# Patient Record
Sex: MALE | Race: OTHER | Employment: FULL TIME | URBAN - METROPOLITAN AREA
[De-identification: names, ages, dates, MRNs, and addresses within clinical notes are randomized per-mention and may not be internally consistent; named-entity substitution may affect disease eponyms.]

---

## 2022-07-19 ENCOUNTER — TELEPHONE (OUTPATIENT)
Dept: ORTHOPEDIC SURGERY | Age: 67
End: 2022-07-19

## 2022-07-25 ENCOUNTER — OFFICE VISIT (OUTPATIENT)
Dept: ORTHOPEDIC SURGERY | Age: 67
End: 2022-07-25
Payer: COMMERCIAL

## 2022-07-25 DIAGNOSIS — M17.0 PRIMARY OSTEOARTHRITIS OF BOTH KNEES: Primary | ICD-10-CM

## 2022-07-25 PROCEDURE — 20610 DRAIN/INJ JOINT/BURSA W/O US: CPT | Performed by: ORTHOPAEDIC SURGERY

## 2022-07-25 NOTE — PROGRESS NOTES
Progress Notes by Laura Monzon MD at 01/04/22 2285                    Author: Laura Monzon MD  Service: --  Author Type: Physician       Filed: 01/04/22 1214  Encounter Date: 1/4/2022  Status: Signed          : Laura Monzon MD (Physician)                            Name: Adriana Erickson   YOB: 1955   Gender: male   MRN: [id number]               HPI: Adriana Erickson is a 73 y.o.  gentleman seen for left knee problems. He has known osteoarthritis in both knees. He returns for repeat viscosupplementation into both knees. ROS/Meds/PSH/PMH/FH/SH: A ten system review of systems was performed and is negative other than what is in the HPI. Tobacco:  reports that he has never smoked. He has never used smokeless tobacco.   There were no vitals taken for this visit. Physical Examination:   He is an awake alert pleasant gentleman ambulate with an antalgic gait on the left side. The right knee has a range of motion of 0 to 130 degrees   Global right knee pain   negative Lachman,   negative anterior drawer,    negative posterior drawer   negative pivot. Good tibial step-off,    No varus or valgus laxity at 0 or 30 degrees. Negative lateral joint line tenderness    negative lateral Dejuan. Negative medial joint line tenderness   negative medial Dejuan. No evidence of any posterolateral instability. No patellofemoral pain. Negative compression,    negative apprehension   no effusion. Calves Are non-tender,   neurovascularly patient is intact. Negative Homans. MPFL is non-tender. Patient Can fully extend the knee. Good quad tone   No erythema. Negative Dial test.         Left knee has a medial parapatellar incision that is healed. The left knee has a range of motion of 0 to 130 degrees   Trace Lachman,   Trace anterior drawer,    negative posterior drawer   negative pivot.     Good tibial step-off,    No varus or valgus laxity at 0 or 30 degrees. Negative lateral joint line tenderness    negative lateral Dejuan. Marked medial joint line tenderness   negative medial Dejuan. No evidence of any posterolateral instability. Positive patellofemoral pain. Negative compression,    negative apprehension   Trace effusion   Calves Are non-tender,   neurovascularly patient is intact. Negative Homans. MPFL is non-tender. Patient Can fully extend the knee. Good quad tone   No erythema. Negative Dial test.                     Data Reviewed:   Minor procedure:     [unfilled]  OFFICE PROCEDURE PROGRESS NOTE    Chart reviewed for the following:   Maria Del Carmen Augustine MD, have reviewed the History, Physical and updated the Allergic reactions for [place] [address] performed immediately prior to start of procedure:   Maria Del Carmen Augustine MD, have performed the following reviews on Ebe Martinez prior to the start of the procedure:            * Patient was identified by name and date of birth   * Agreement on procedure being performed was verified  * Risks and Benefits explained to the patient  * Procedure site verified and marked as necessary  * Patient was positioned for comfort     Time: 2:29 PM   Date of procedure: 7/25/2022  Procedure performed by:  Glenna Casas MD    Procedure: After sterile prep and drape the patient received Durolane visco-supplementation 60 mg and 3 cc into the right knee. The patient tolerated the procedure well. A sterile dressing was applied.           OFFICE PROCEDURE PROGRESS NOTE    Chart reviewed for the following:   Maria Del Carmen Augustine MD, have reviewed the History, Physical and updated the Allergic reactions for [place] [address] performed immediately prior to start of procedure:   Maria Del Carmen Augustine MD, have performed the following reviews on Ebe Martinez prior to the start of the procedure:            * Patient was identified by name and date of birth   * Agreement on procedure being performed was verified  * Risks and Benefits explained to the patient  * Procedure site verified and marked as necessary  * Patient was positioned for comfort     Time: 2:30 PM   Date of procedure: 7/25/2022  Procedure performed by:  James Vega MD    Procedure: After sterile prep and drape the patient received Durolane visco-supplementation 60 mg and 3 cc into the left knee. The patient tolerated the procedure well. A sterile dressing was applied. Impression:         1. Primary osteoarthritis of both knees           Status post Durolane viscosupplementation into both knees 7/25/2022    Status post previous left knee operative procedure. Glenohumeral osteoarthritis left shoulder    Biceps tendinitis left shoulder    AC joint arthritis left shoulder    Atrial fibrillation on Eliquis    Hypertension    Gout      Plan:    I discussed the problem with the patient. He will follow our post viscosupplementation protocol we will recheck back in 6 months for repeat viscosupplementation into both knees         Follow up:         Follow-up and Dispositions       Return in about 6 months                              Genoveva Hatchet., MD

## 2022-12-30 ENCOUNTER — TELEPHONE (OUTPATIENT)
Dept: ORTHOPEDIC SURGERY | Age: 67
End: 2022-12-30

## 2022-12-30 NOTE — TELEPHONE ENCOUNTER
Pt is  down on 1/24/23 and he states it is supposed to be  for  bilateral  gel injections. If not able to  do  please  call pt.   Otherwise  I  labeled it and it will need  your  approval

## 2023-01-09 DIAGNOSIS — M17.0 BILATERAL PRIMARY OSTEOARTHRITIS OF KNEE: Primary | ICD-10-CM

## 2023-01-24 ENCOUNTER — OFFICE VISIT (OUTPATIENT)
Dept: ORTHOPEDIC SURGERY | Age: 68
End: 2023-01-24

## 2023-01-24 DIAGNOSIS — M17.0 BILATERAL PRIMARY OSTEOARTHRITIS OF KNEE: Primary | ICD-10-CM

## 2023-01-24 NOTE — PROGRESS NOTES
Progress Notes by Haja Junior MD at 01/04/22 8381                    Author: Haja Junior MD  Service: --  Author Type: Physician       Filed: 01/04/22 1214  Encounter Date: 1/4/2022  Status: Signed          : Haja Junior MD (Physician)                            Name: Gia Alvarez   YOB: 1955   Gender: male   MRN: [id number]               HPI: Gia Alvarez is a 73 y.o.  gentleman seen for bilateral knee problems. He has known osteoarthritis in both knees. He returns for repeat viscosupplementation into both knees. The Durolane viscosupplementation worked really well last time. He has lost 30 pounds      ROS/Meds/PSH/PMH/FH/SH: A ten system review of systems was performed and is negative other than what is in the HPI. Tobacco:  reports that he has never smoked. He has never used smokeless tobacco.   There were no vitals taken for this visit. Physical Examination:   He is an awake alert pleasant gentleman ambulate with an antalgic gait on the left side. The right knee has a range of motion of 0 to 130 degrees   Global right knee pain   negative Lachman,   negative anterior drawer,    negative posterior drawer   negative pivot. Good tibial step-off,    No varus or valgus laxity at 0 or 30 degrees. Negative lateral joint line tenderness    negative lateral Dejuan. Negative medial joint line tenderness   negative medial Dejuan. No evidence of any posterolateral instability. No patellofemoral pain. Negative compression,    negative apprehension   no effusion. Calves Are non-tender,   neurovascularly patient is intact. Negative Homans. MPFL is non-tender. Patient Can fully extend the knee. Good quad tone   No erythema. Negative Dial test.         Left knee has a medial parapatellar incision that is healed.    The left knee has a range of motion of 0 to 130 degrees   Trace Lachman,   Trace anterior drawer, negative posterior drawer   negative pivot. Good tibial step-off,    No varus or valgus laxity at 0 or 30 degrees. Negative lateral joint line tenderness    negative lateral Dejuan. Marked medial joint line tenderness   negative medial Dejuan. No evidence of any posterolateral instability. Positive patellofemoral pain. Negative compression,    negative apprehension   Trace effusion   Calves Are non-tender,   neurovascularly patient is intact. Negative Homans. MPFL is non-tender. Patient Can fully extend the knee. Good quad tone   No erythema. Negative Dial test.                     Data Reviewed:   Minor procedure:            OFFICE PROCEDURE PROGRESS NOTE    Chart reviewed for the following:   Marissa Junior MD, have reviewed the History, Physical and updated the Allergic reactions for [place] [address] performed immediately prior to start of procedure:   Marissa Junior MD, have performed the following reviews on Coco Boxer prior to the start of the procedure:            * Patient was identified by name and date of birth   * Agreement on procedure being performed was verified  * Risks and Benefits explained to the patient  * Procedure site verified and marked as necessary  * Patient was positioned for comfort     Time: 10:28 AM   Date of procedure: 1/24/2023  Procedure performed by:  Raghu Ken MD    Procedure: After sterile prep and drape the patient received Durolane visco-supplementation 60 mg in 3 cc into the right knee. The patient tolerated the procedure well. A sterile dressing was applied.           OFFICE PROCEDURE PROGRESS NOTE    Chart reviewed for the following:   Marissa Junior MD, have reviewed the History, Physical and updated the Allergic reactions for [place] [address] performed immediately prior to start of procedure:   Marissa Junior MD, have performed the following reviews on Coco Boxer prior to the start of the procedure:            * Patient was identified by name and date of birth   * Agreement on procedure being performed was verified  * Risks and Benefits explained to the patient  * Procedure site verified and marked as necessary  * Patient was positioned for comfort     Time: 10:28 AM   Date of procedure: 1/24/2023  Procedure performed by:  Anastacia Mccartney MD    Procedure: After sterile prep and drape the patient received Durolane visco-supplementation 60 mg in 3 cc into the left knee. The patient tolerated the procedure well. A sterile dressing was applied. Impression:         1. Primary osteoarthritis of both knees           Status post Durolane viscosupplementation into both knees 1/24/2023, 7/25/2022    Status post previous left knee operative procedure. Glenohumeral osteoarthritis left shoulder    Biceps tendinitis left shoulder    AC joint arthritis left shoulder    Atrial fibrillation on Eliquis    Hypertension    Gout      Plan:    I discussed the problem with the patient. He will follow our post viscosupplementation protocol we will recheck back in 6 months for repeat viscosupplementation into both knees in August 2023         Follow up:         Follow-up and Dispositions       Return in about 6 months                              Saud Colvin MD

## 2023-06-27 ENCOUNTER — OFFICE VISIT (OUTPATIENT)
Dept: ORTHOPEDIC SURGERY | Age: 68
End: 2023-06-27

## 2023-06-27 ENCOUNTER — TELEPHONE (OUTPATIENT)
Dept: ORTHOPEDIC SURGERY | Age: 68
End: 2023-06-27

## 2023-06-27 DIAGNOSIS — M1A.9XX1 CHRONIC GOUT INVOLVING TOE OF LEFT FOOT WITH TOPHUS, UNSPECIFIED CAUSE: ICD-10-CM

## 2023-06-27 DIAGNOSIS — M21.612 BUNION OF GREAT TOE OF LEFT FOOT: Primary | ICD-10-CM

## 2023-06-27 DIAGNOSIS — M75.22 BICIPITAL TENDINITIS OF LEFT SHOULDER: ICD-10-CM

## 2023-06-27 DIAGNOSIS — M19.012 DEGENERATIVE JOINT DISEASE OF LEFT ACROMIOCLAVICULAR JOINT: ICD-10-CM

## 2023-06-27 DIAGNOSIS — M19.012 OSTEOARTHRITIS OF LEFT GLENOHUMERAL JOINT: Primary | ICD-10-CM

## 2023-06-27 DIAGNOSIS — M17.0 BILATERAL PRIMARY OSTEOARTHRITIS OF KNEE: ICD-10-CM

## 2023-06-27 DIAGNOSIS — M21.612 BUNION OF GREAT TOE OF LEFT FOOT: ICD-10-CM

## 2023-06-27 RX ORDER — METHYLPREDNISOLONE ACETATE 80 MG/ML
80 INJECTION, SUSPENSION INTRA-ARTICULAR; INTRALESIONAL; INTRAMUSCULAR; SOFT TISSUE ONCE
Status: COMPLETED | OUTPATIENT
Start: 2023-06-27 | End: 2023-06-27

## 2023-06-27 RX ADMIN — METHYLPREDNISOLONE ACETATE 80 MG: 80 INJECTION, SUSPENSION INTRA-ARTICULAR; INTRALESIONAL; INTRAMUSCULAR; SOFT TISSUE at 10:02

## 2023-07-26 DIAGNOSIS — M17.0 PRIMARY OSTEOARTHRITIS OF BOTH KNEES: Primary | ICD-10-CM

## 2023-08-15 ENCOUNTER — OFFICE VISIT (OUTPATIENT)
Dept: ORTHOPEDIC SURGERY | Age: 68
End: 2023-08-15
Payer: COMMERCIAL

## 2023-08-15 DIAGNOSIS — M19.012 DEGENERATIVE JOINT DISEASE OF LEFT ACROMIOCLAVICULAR JOINT: ICD-10-CM

## 2023-08-15 DIAGNOSIS — M21.612 BUNION OF GREAT TOE OF LEFT FOOT: ICD-10-CM

## 2023-08-15 DIAGNOSIS — M19.012 OSTEOARTHRITIS OF LEFT GLENOHUMERAL JOINT: Primary | ICD-10-CM

## 2023-08-15 DIAGNOSIS — M1A.9XX1 CHRONIC GOUT INVOLVING TOE OF LEFT FOOT WITH TOPHUS, UNSPECIFIED CAUSE: ICD-10-CM

## 2023-08-15 DIAGNOSIS — M75.22 BICIPITAL TENDINITIS OF LEFT SHOULDER: ICD-10-CM

## 2023-08-15 PROCEDURE — 1123F ACP DISCUSS/DSCN MKR DOCD: CPT | Performed by: ORTHOPAEDIC SURGERY

## 2023-08-15 PROCEDURE — 99215 OFFICE O/P EST HI 40 MIN: CPT | Performed by: ORTHOPAEDIC SURGERY

## 2023-08-15 NOTE — PROGRESS NOTES
Progress Notes by Americo Bond MD at 01/04/22 9359                    Author: Americo Bond MD  Service: --  Author Type: Physician       Filed: 01/04/22 1214  Encounter Date: 1/4/2022  Status: Signed          : Americo Bond MD (Physician)                            Name: Yahir Askew   YOB: 1955   Gender: male   MRN: [id number]     What: Left shoulder pain  How: Insidious onset         HPI: Yahir Askew is a 78 y.o. right-hand-dominant gentleman seen for left shoulder pain. He has known osteoarthritis in both knees. He has had viscosupplementation into both knees. His knees are doing better. He has known glenohumeral osteoarthritis left shoulder. I have injected him previously. His left shoulder has progressively gotten worse. He complains of severe pain and limitation of function. It is affecting his quality of life. ROS/Meds/PSH/PMH/FH/SH: A ten system review of systems was performed and is negative other than what is in the HPI. Tobacco:  reports that he has never smoked. He has never used smokeless tobacco.   There were no vitals taken for this visit. Physical Examination:   He is an awake alert pleasant gentleman ambulate with an antalgic gait on the left side. The right knee has a range of motion of 0 to 130 degrees   Global right knee pain   negative Lachman,   negative anterior drawer,    negative posterior drawer   negative pivot. Good tibial step-off,    No varus or valgus laxity at 0 or 30 degrees. Negative lateral joint line tenderness    negative lateral Dejuan. Negative medial joint line tenderness   negative medial Dejuan. No evidence of any posterolateral instability. No patellofemoral pain. Negative compression,    negative apprehension   no effusion. Calves Are non-tender,   neurovascularly patient is intact. Negative Homans. MPFL is non-tender. Patient Can fully extend the knee.     Good

## 2023-08-17 ENCOUNTER — TELEPHONE (OUTPATIENT)
Dept: ORTHOPEDIC SURGERY | Age: 68
End: 2023-08-17

## 2023-08-17 NOTE — TELEPHONE ENCOUNTER
Called and spoke to pt and informed him that we received a message from [address] stating that he will need to be seen prior to getting cardiac clearance. Pt will call to schedule.

## 2023-08-22 ENCOUNTER — HOSPITAL ENCOUNTER (OUTPATIENT)
Age: 68
Discharge: HOME | End: 2023-08-24
Payer: COMMERCIAL

## 2023-08-22 DIAGNOSIS — M19.012 OSTEOARTHRITIS OF LEFT GLENOHUMERAL JOINT: ICD-10-CM

## 2023-08-22 PROCEDURE — 73221 MRI JOINT UPR EXTREM W/O DYE: CPT

## 2023-08-23 ENCOUNTER — TELEPHONE (OUTPATIENT)
Dept: ORTHOPEDIC SURGERY | Age: 68
End: 2023-08-23

## 2023-08-23 NOTE — TELEPHONE ENCOUNTER
Called and spoke with the patient, he goes to the cardiologist tomorrow at 2pm, told patient once I receive clearance I can book him for surgery. Patient will update tomorrow after his appointment.

## 2023-08-23 NOTE — TELEPHONE ENCOUNTER
He had his MRI yesterday. His shoulder has popped out twice in three days. He wants to know if this whole process can go any faster because he is hurting. Please give him a call.

## 2023-08-24 ENCOUNTER — TELEPHONE (OUTPATIENT)
Dept: ORTHOPEDIC SURGERY | Age: 68
End: 2023-08-24

## 2023-08-24 NOTE — TELEPHONE ENCOUNTER
Called and spoke to pt who states that he was seen by card today and was given the \"all clear for surgery\". I informed him that we have not received clearance yet, but when we do, we can schedule surgery. Pt states he is scheduled to have echo tomorrow at 2pm for his yearly evaluation, but states he was told that clearance would be sent prior to his echo. Pt would like surgery next week if possible. LBF informed.

## 2023-08-24 NOTE — TELEPHONE ENCOUNTER
He is having surgery next week. He had an EKG today and is going to have to have an ECHO. He wants to know what this will be done. This was left on voicemail. I called the patient, he says the EKG normal. He says they are faxing the clearance over. He wants to know when he can get surgery scheduled. He says the ECHO is just an \"extra step\".

## 2023-08-28 ENCOUNTER — TELEPHONE (OUTPATIENT)
Dept: ORTHOPEDIC SURGERY | Age: 68
End: 2023-08-28

## 2023-08-28 NOTE — TELEPHONE ENCOUNTER
Called and left voicemail for patient, that we need a clearance letter from cardiology since echo has been done to ensure there has not been any changes.

## 2023-08-28 NOTE — TELEPHONE ENCOUNTER
Unable to contact pt's cell or LVM on cell phone due to blocked call message 2x. Spoke with pt's wife when home phone number was utilized to notify pt that cardiac clearance has not been received. Kaleb Gastelum has sent a request for cardiac clearance to cardiologist. Pt has 10:15am appt tomorrow morning.

## 2023-08-28 NOTE — TELEPHONE ENCOUNTER
He is calling again very frustrated that isn't able to speak with someone. He states he needs to know if his surgery is on Thursday or Friday. I had him on hold and was checking with Yoshi Madison but he hung up. Please call as soon as you can.

## 2023-08-29 ENCOUNTER — OFFICE VISIT (OUTPATIENT)
Dept: ORTHOPEDIC SURGERY | Age: 68
End: 2023-08-29
Payer: COMMERCIAL

## 2023-08-29 DIAGNOSIS — M19.012 OSTEOARTHRITIS OF LEFT GLENOHUMERAL JOINT: Primary | ICD-10-CM

## 2023-08-29 DIAGNOSIS — M19.012 DEGENERATIVE JOINT DISEASE OF LEFT ACROMIOCLAVICULAR JOINT: ICD-10-CM

## 2023-08-29 DIAGNOSIS — M75.22 BICIPITAL TENDINITIS OF LEFT SHOULDER: ICD-10-CM

## 2023-08-29 PROCEDURE — 1123F ACP DISCUSS/DSCN MKR DOCD: CPT | Performed by: ORTHOPAEDIC SURGERY

## 2023-08-29 PROCEDURE — 99215 OFFICE O/P EST HI 40 MIN: CPT | Performed by: ORTHOPAEDIC SURGERY

## 2023-08-29 NOTE — PROGRESS NOTES
Progress Notes by Ольга Mcguire MD at 01/04/22 9521                    Author: Ольга Mcguire MD  Service: --  Author Type: Physician       Filed: 01/04/22 1214  Encounter Date: 1/4/2022  Status: Signed          : Ольга Mcguire MD (Physician)                            Name: Valeriy Perez   YOB: 1955   Gender: male   MRN: [id number]              HPI: Valeriy Perez is a 78 y.o. right-hand-dominant gentleman seen for left shoulder pain. He has known osteoarthritis in both knees. He has had viscosupplementation into both knees. His knees are doing better. He has known glenohumeral osteoarthritis left shoulder. I have injected him previously. His left shoulder has progressively gotten worse. He complains of severe pain and limitation of function. It is affecting his quality of life. ROS/Meds/PSH/PMH/FH/SH: A ten system review of systems was performed and is negative other than what is in the HPI. Tobacco:  reports that he has never smoked. He has never used smokeless tobacco.   There were no vitals taken for this visit. Physical Examination:   He is an awake alert pleasant gentleman ambulate with an antalgic gait on the left side. The right knee has a range of motion of 0 to 130 degrees   Global right knee pain   negative Lachman,   negative anterior drawer,    negative posterior drawer   negative pivot. Good tibial step-off,    No varus or valgus laxity at 0 or 30 degrees. Negative lateral joint line tenderness    negative lateral Dejuan. Negative medial joint line tenderness   negative medial Dejuan. No evidence of any posterolateral instability. No patellofemoral pain. Negative compression,    negative apprehension   no effusion. Calves Are non-tender,   neurovascularly patient is intact. Negative Homans. MPFL is non-tender. Patient Can fully extend the knee. Good quad tone   No erythema.     Negative Dial

## 2023-08-31 ENCOUNTER — PREP FOR PROCEDURE (OUTPATIENT)
Dept: ORTHOPEDIC SURGERY | Age: 68
End: 2023-08-31

## 2023-08-31 DIAGNOSIS — M19.012 OSTEOARTHRITIS OF LEFT GLENOHUMERAL JOINT: Primary | ICD-10-CM

## 2023-08-31 RX ORDER — SODIUM CHLORIDE 0.9 % (FLUSH) 0.9 %
5-40 SYRINGE (ML) INJECTION EVERY 12 HOURS SCHEDULED
Status: CANCELLED | OUTPATIENT
Start: 2023-08-31

## 2023-08-31 RX ORDER — SODIUM CHLORIDE 0.9 % (FLUSH) 0.9 %
5-40 SYRINGE (ML) INJECTION PRN
Status: CANCELLED | OUTPATIENT
Start: 2023-08-31

## 2023-08-31 RX ORDER — SODIUM CHLORIDE 9 MG/ML
INJECTION, SOLUTION INTRAVENOUS PRN
Status: CANCELLED | OUTPATIENT
Start: 2023-08-31

## 2023-09-07 ENCOUNTER — HOSPITAL ENCOUNTER (OUTPATIENT)
Dept: SURGERY | Age: 68
Discharge: HOME | End: 2023-09-07
Payer: COMMERCIAL

## 2023-09-07 VITALS
BODY MASS INDEX: 32.41 KG/M2 | RESPIRATION RATE: 16 BRPM | HEART RATE: 72 BPM | SYSTOLIC BLOOD PRESSURE: 132 MMHG | TEMPERATURE: 98.3 F | OXYGEN SATURATION: 99 % | DIASTOLIC BLOOD PRESSURE: 87 MMHG | WEIGHT: 231.48 LBS | HEIGHT: 71 IN

## 2023-09-07 DIAGNOSIS — M19.012 OSTEOARTHRITIS OF LEFT GLENOHUMERAL JOINT: ICD-10-CM

## 2023-09-07 PROBLEM — M75.22 BICIPITAL TENDINITIS OF LEFT SHOULDER: Status: ACTIVE | Noted: 2023-09-07

## 2023-09-07 LAB
ALBUMIN SERPL-MCNC: 3.5 G/DL (ref 3.2–4.6)
ALBUMIN/GLOB SERPL: 0.9 (ref 0.4–1.6)
ALP SERPL-CCNC: 52 U/L (ref 50–136)
ALT SERPL-CCNC: 39 U/L (ref 12–65)
ANION GAP SERPL CALC-SCNC: 6 MMOL/L (ref 2–11)
APPEARANCE UR: CLEAR
AST SERPL-CCNC: 23 U/L (ref 15–37)
BASOPHILS # BLD: 0 K/UL (ref 0–0.2)
BASOPHILS NFR BLD: 0 % (ref 0–2)
BILIRUB SERPL-MCNC: 0.4 MG/DL (ref 0.2–1.1)
BILIRUB UR QL: NEGATIVE
BUN SERPL-MCNC: 28 MG/DL (ref 8–23)
CALCIUM SERPL-MCNC: 9.2 MG/DL (ref 8.3–10.4)
CHLORIDE SERPL-SCNC: 107 MMOL/L (ref 101–110)
CO2 SERPL-SCNC: 28 MMOL/L (ref 21–32)
COLOR UR: NORMAL
CREAT SERPL-MCNC: 0.84 MG/DL (ref 0.8–1.5)
DIFFERENTIAL METHOD BLD: ABNORMAL
EOSINOPHIL # BLD: 0.2 K/UL (ref 0–0.8)
EOSINOPHIL NFR BLD: 4 % (ref 0.5–7.8)
ERYTHROCYTE [DISTWIDTH] IN BLOOD BY AUTOMATED COUNT: 13 % (ref 11.9–14.6)
GLOBULIN SER CALC-MCNC: 3.7 G/DL (ref 2.8–4.5)
GLUCOSE SERPL-MCNC: 104 MG/DL (ref 65–100)
GLUCOSE UR STRIP.AUTO-MCNC: NEGATIVE MG/DL
HCT VFR BLD AUTO: 44.6 % (ref 41.1–50.3)
HGB BLD-MCNC: 15 G/DL (ref 13.6–17.2)
HGB UR QL STRIP: NEGATIVE
IMM GRANULOCYTES # BLD AUTO: 0 K/UL (ref 0–0.5)
IMM GRANULOCYTES NFR BLD AUTO: 0 % (ref 0–5)
INR PPP: 1.2
KETONES UR QL STRIP.AUTO: NEGATIVE MG/DL
LEUKOCYTE ESTERASE UR QL STRIP.AUTO: NEGATIVE
LYMPHOCYTES # BLD: 1.4 K/UL (ref 0.5–4.6)
LYMPHOCYTES NFR BLD: 33 % (ref 13–44)
MAGNESIUM SERPL-MCNC: 2.3 MG/DL (ref 1.8–2.4)
MCH RBC QN AUTO: 33 PG (ref 26.1–32.9)
MCHC RBC AUTO-ENTMCNC: 33.6 G/DL (ref 31.4–35)
MCV RBC AUTO: 98 FL (ref 82–102)
MONOCYTES # BLD: 0.4 K/UL (ref 0.1–1.3)
MONOCYTES NFR BLD: 9 % (ref 4–12)
MRSA DNA SPEC QL NAA+PROBE: NOT DETECTED
NEUTS SEG # BLD: 2.4 K/UL (ref 1.7–8.2)
NEUTS SEG NFR BLD: 54 % (ref 43–78)
NITRITE UR QL STRIP.AUTO: NEGATIVE
NRBC # BLD: 0 K/UL (ref 0–0.2)
PH UR STRIP: 5.5 (ref 5–9)
PLATELET # BLD AUTO: 145 K/UL (ref 150–450)
PMV BLD AUTO: 9.3 FL (ref 9.4–12.3)
POTASSIUM SERPL-SCNC: 4.2 MMOL/L (ref 3.5–5.1)
PROT SERPL-MCNC: 7.2 G/DL (ref 6.3–8.2)
PROT UR STRIP-MCNC: NEGATIVE MG/DL
PROTHROMBIN TIME: 15 SEC (ref 12.6–14.3)
RBC # BLD AUTO: 4.55 M/UL (ref 4.23–5.6)
S AUREUS CPE NOSE QL NAA+PROBE: NOT DETECTED
SODIUM SERPL-SCNC: 141 MMOL/L (ref 133–143)
SP GR UR REFRACTOMETRY: 1.02 (ref 1–1.02)
UROBILINOGEN UR QL STRIP.AUTO: 0.2 EU/DL (ref 0.2–1)
WBC # BLD AUTO: 4.3 K/UL (ref 4.3–11.1)

## 2023-09-07 PROCEDURE — 80053 COMPREHEN METABOLIC PANEL: CPT

## 2023-09-07 PROCEDURE — 85025 COMPLETE CBC W/AUTO DIFF WBC: CPT

## 2023-09-07 PROCEDURE — 83735 ASSAY OF MAGNESIUM: CPT

## 2023-09-07 PROCEDURE — 87641 MR-STAPH DNA AMP PROBE: CPT

## 2023-09-07 PROCEDURE — 81003 URINALYSIS AUTO W/O SCOPE: CPT

## 2023-09-07 PROCEDURE — 85610 PROTHROMBIN TIME: CPT

## 2023-09-07 RX ORDER — CARVEDILOL 6.25 MG/1
6.25 TABLET ORAL 2 TIMES DAILY
COMMUNITY
Start: 2023-07-19

## 2023-09-07 RX ORDER — ACETAMINOPHEN 500 MG
500 TABLET ORAL EVERY 6 HOURS PRN
COMMUNITY

## 2023-09-07 RX ORDER — ALLOPURINOL 100 MG/1
100 TABLET ORAL 2 TIMES DAILY
COMMUNITY

## 2023-09-07 RX ORDER — APIXABAN 5 MG/1
5 TABLET, FILM COATED ORAL 2 TIMES DAILY
COMMUNITY
Start: 2023-06-19

## 2023-09-07 NOTE — PERIOP NOTE
PT results to be reviewed by anesthesia-charge nurse to f/u. All other lab results listed below are within anesthesia guidelines. Labs automatically routed to ordering provider via Epic documentation.        Latest Reference Range & Units 09/07/23 08:40   Sodium 133 - 143 mmol/L 141   Potassium 3.5 - 5.1 mmol/L 4.2   Chloride 101 - 110 mmol/L 107   CO2 21 - 32 mmol/L 28   BUN,BUNPL 8 - 23 MG/DL 28 (H)   Creatinine 0.8 - 1.5 MG/DL 0.84   Anion Gap 2 - 11 mmol/L 6   Est, Glom Filt Rate >60 ml/min/1.73m2 >60   Magnesium 1.8 - 2.4 mg/dL 2.3   Glucose, Random 65 - 100 mg/dL 104 (H)   CALCIUM, SERUM, 639928 8.3 - 10.4 MG/DL 9.2   ALBUMIN/GLOBULIN RATIO 0.4 - 1.6   0.9   Total Protein 6.3 - 8.2 g/dL 7.2   Albumin 3.2 - 4.6 g/dL 3.5   Globulin 2.8 - 4.5 g/dL 3.7   Alk Phos 50 - 136 U/L 52   ALT 12 - 65 U/L 39   AST 15 - 37 U/L 23   BILIRUBIN TOTAL 0.2 - 1.1 MG/DL 0.4   WBC 4.3 - 11.1 K/uL 4.3   RBC 4.23 - 5.6 M/uL 4.55   Hemoglobin Quant 13.6 - 17.2 g/dL 15.0   Hematocrit 41.1 - 50.3 % 44.6   MCV 82.0 - 102.0 FL 98.0   MCH 26.1 - 32.9 PG 33.0 (H)   MCHC 31.4 - 35.0 g/dL 33.6   MPV 9.4 - 12.3 FL 9.3 (L)   RDW 11.9 - 14.6 % 13.0   Platelet Count 640 - 450 K/uL 145 (L)   Neutrophils % 43 - 78 % 54   Lymphocyte % 13 - 44 % 33   Monocytes % 4.0 - 12.0 % 9   Eosinophils % 0.5 - 7.8 % 4   Basophils % 0.0 - 2.0 % 0   Neutrophils Absolute 1.7 - 8.2 K/UL 2.4   Lymphocytes Absolute 0.5 - 4.6 K/UL 1.4   Monocytes Absolute 0.1 - 1.3 K/UL 0.4   Eosinophils Absolute 0.0 - 0.8 K/UL 0.2   Basophils Absolute 0.0 - 0.2 K/UL 0.0   Differential Type -   AUTOMATED   Immature Granulocytes 0.0 - 5.0 % 0   Nucleated Red Blood Cells 0.0 - 0.2 K/uL 0.00   Absolute Immature Granulocyte 0.0 - 0.5 K/UL 0.0   Prothrombin Time 12.6 - 14.3 sec 15.0 (H)   INR -   1.2   Color, UA -   YELLOW/STRAW   Glucose, UA mg/dL Negative   Bilirubin, Urine NEG   Negative   Ketones, Urine NEG mg/dL Negative   Specific Gravity, UA 1.001 - 1.023   1.021   Blood, Urine NEG Negative   Protein, UA NEG mg/dL Negative   Urobilinogen, Urine 0.2 - 1.0 EU/dL 0.2   Nitrite, Urine NEG   Negative   Leukocyte Esterase, Urine NEG   Negative   Appearance -   CLEAR   pH, Urine 5.0 - 9.0   5.5   MRSA/STAPH AUREUS DNA  Rpt   (H): Data is abnormally high  (L): Data is abnormally low  Rpt: View report in Results Review for more information

## 2023-09-07 NOTE — PERIOP NOTE
The following records have been requested from [facility]:       [address]    Please send last 2 EKG tracings via fax to [phone]. Thank you!

## 2023-09-07 NOTE — H&P
Subjective:     Patient is a 78 y.o. RHD MALE WITH LEFT SHOULDER PAIN. SEE OFFICE NOTE. Patient Active Problem List    Diagnosis Date Noted    Osteoarthritis of left glenohumeral joint 08/29/2023    Bicipital tendinitis of left shoulder 09/07/2023    Degenerative joint disease of left acromioclavicular joint 08/29/2023     Past Medical History:   Diagnosis Date    CAD (coronary artery disease)     Chronic anticoagulation     Dilated cardiomyopathy ([address])     Echo (8/25/23) EF 55-65%    Essential hypertension     History of COVID-19 02/01/2023    Marijuana use     DONITA (obstructive sleep apnea)     CPAP QHS    Permanent atrial fibrillation (HCC)     rate controlled with beta-blocker, Eliquis 5 mg BID for stroke prophylaxis, followed by [facility]    Varicose veins of both lower extremities       Past Surgical History:   Procedure Laterality Date    KNEE ARTHROSCOPY Left 1988    VEIN SURGERY      EVLT of the LT great saphenous vein      Prior to Admission medications    Medication Sig Start Date End Date Taking? Authorizing Provider   carvedilol (COREG) 6.25 MG tablet Take 1 tablet by mouth 2 times daily 7/19/23   Historical Provider, MD   ELIQUIS 5 MG TABS tablet Take 1 tablet by mouth 2 times daily 6/19/23   Historical Provider, MD   allopurinol (ZYLOPRIM) 100 MG tablet Take 1 tablet by mouth 2 times daily    Historical Provider, MD   acetaminophen (TYLENOL) 500 MG tablet Take 1 tablet by mouth every 6 hours as needed for Pain    Historical Provider, MD   diclofenac sodium (VOLTAREN) 1 % GEL Apply topically 2 times daily    Historical Provider, MD     No Known Allergies   Social History     Tobacco Use    Smoking status: Never    Smokeless tobacco: Never   Substance Use Topics    Alcohol use: Not Currently      No family history on file. Review of Systems  Pertinent items are noted in HPI. Objective:     No data found. There were no vitals taken for this visit.     General Appearance:

## 2023-09-07 NOTE — PERIOP NOTE
PLEASE CONTINUE TAKING ALL PRESCRIPTION MEDICATIONS UP TO THE DAY OF SURGERY UNLESS OTHERWISE DIRECTED BELOW. DISCONTINUE all vitamins, herbals (marijuana),and supplements starting today. DISCONTINUE Non-Steriodal Anti-Inflammatory (NSAIDS) such as Advil, Ibuprofen, Motrin, Naproxen and Aleve 5 days prior to surgery. *IT IS OK TO TAKE TYLENOL*                                     Home Medications to HOLD                                     (In addition to the above)     Follow surgeon's instructions regarding Eliquis       *No Diclofenac (Voltaren) 5 days prior to surgery*                 Home medications to TAKE the morning of surgery    Carvedilol, Allopurinol            Comments   The day before surgery please take Tylenol (Acetaminophen) 1000mg in the morning and 1000mg before bed. You may substitute for Tylenol 650 mg.         Bring CPAP, Dynahex wash, and Incentive Spirometer with you to hospital on the day of surgery. Please do not bring home medications with you on the day of surgery unless otherwise directed by your nurse. If you are instructed to bring home medications, please give them to your nurse as they will be administered by the nursing staff. If you have any questions, please call [facility] [phone]. A copy of this note was provided to the patient for reference.

## 2023-09-07 NOTE — PERIOP NOTE
Patient verified name and . Order for consent found in EHR and matches case posting; patient verified. Type 3 surgery, joint assessment complete. Labs per surgeon: CBC,CMP, PT/INR, Mg, UA; results pending. Labs per anesthesia protocol: no additional  EKG:Completed 23 at [address] will not upload in [address]. Last 2 EKG tracings requested from [facility], via EHR, and will be faxed to [phone]. Charge nurse to f/u. EKG tracing dated 23 loaded, after patient's arrived to PAT appointment, and is located under Media Tab. Results are within anesthesia guidelines. Cardiology office note with surgical/medication clearance (23) and Echo (23) located in EHR if needed for anesthesia reference. MRSA/MSSA swab collected; pharmacy to review and dose antibiotic as appropriate. Hospital approved surgical skin cleanser and instructions to return bottle on DOS given per hospital policy. Patient provided with handouts including Guide to Surgery, Pain Management, Hand Hygiene, Blood Transfusion Education, and [facility] Brochure. Patient answered medical/surgical history questions at their best of ability. All prior to admission medications documented in [facility]. Original medication prescription bottle not visualized during patient appointment. Patient instructed to hold all vitamins, supplements, herbals (marijuana) starting today and NSAIDS 5 days prior to surgery. *PATIENT INSTRUCTED TO FOLLOW SURGEON'S INSTRUCTIONS REGARDING ELIQUIS*    Patient teach back successful and patient demonstrates knowledge of instruction.

## 2023-09-11 ENCOUNTER — TELEPHONE (OUTPATIENT)
Dept: ORTHOPEDIC SURGERY | Age: 68
End: 2023-09-11

## 2023-09-11 DIAGNOSIS — M19.012 OSTEOARTHRITIS OF LEFT GLENOHUMERAL JOINT: Primary | ICD-10-CM

## 2023-09-11 RX ORDER — LIDOCAINE 50 MG/G
1 PATCH TOPICAL DAILY
Qty: 10 PATCH | Refills: 0 | Status: SHIPPED | OUTPATIENT
Start: 2023-09-11 | End: 2023-09-21

## 2023-09-11 NOTE — PROGRESS NOTES
Take 1 tablet by mouth 2 times daily    Historical Provider, MD     No Known Allergies       Objective:     Physical Exam:   No data found. ECG:    No results found for this or any previous visit (from the past 4464 hour(s)). Data Review:   Labs:   Labs reviewed    Problem List:  )  Patient Active Problem List   Diagnosis    Osteoarthritis of left glenohumeral joint    Degenerative joint disease of left acromioclavicular joint    Bicipital tendinitis of left shoulder       Total Joint Surgery Pre-Assessment Recommendations:           Continuous saturation monitoring UPON ARRIVAL TO FLOOR. Heated high flow lung expansion x 24 hours and prn.   IP RT consult for respiratory evaluation every shift    .mmal    Signed By: ANDRIY Mercado CNP-C    September 11, 2023

## 2023-09-11 NOTE — TELEPHONE ENCOUNTER
He was seen last week and discussed Lidocaine patches to be sent in but they weren't sent in. Please send to the [facility] on file.

## 2023-09-12 ENCOUNTER — TELEPHONE (OUTPATIENT)
Dept: ORTHOPEDIC SURGERY | Age: 68
End: 2023-09-12

## 2023-09-12 NOTE — TELEPHONE ENCOUNTER
Called and spoke to pt's wife and advised them that per AGP note and LBF, pt does not need to stop eliquis prior to surgery. She agreed.

## 2023-09-12 NOTE — TELEPHONE ENCOUNTER
His wife is calling to see when he is supposed to stop Eliquis. His surgery is on Thursday. He took it last night but not yet today.

## 2023-09-13 ENCOUNTER — ANESTHESIA EVENT (OUTPATIENT)
Dept: SURGERY | Age: 68
End: 2023-09-13
Payer: COMMERCIAL

## 2023-09-14 ENCOUNTER — APPOINTMENT (OUTPATIENT)
Dept: GENERAL RADIOLOGY | Age: 68
End: 2023-09-14
Attending: ORTHOPAEDIC SURGERY
Payer: COMMERCIAL

## 2023-09-14 ENCOUNTER — ANESTHESIA (OUTPATIENT)
Dept: SURGERY | Age: 68
End: 2023-09-14
Payer: COMMERCIAL

## 2023-09-14 ENCOUNTER — HOSPITAL ENCOUNTER (OUTPATIENT)
Age: 68
Setting detail: OBSERVATION
Discharge: HOME | End: 2023-09-15
Attending: ORTHOPAEDIC SURGERY | Admitting: ORTHOPAEDIC SURGERY
Payer: COMMERCIAL

## 2023-09-14 DIAGNOSIS — M19.012 DEGENERATIVE JOINT DISEASE OF LEFT ACROMIOCLAVICULAR JOINT: ICD-10-CM

## 2023-09-14 DIAGNOSIS — M19.012 OSTEOARTHRITIS OF LEFT GLENOHUMERAL JOINT: ICD-10-CM

## 2023-09-14 LAB
ABO + RH BLD: NORMAL
BLOOD GROUP ANTIBODIES SERPL: NORMAL
EST. AVERAGE GLUCOSE BLD GHB EST-MCNC: 114 MG/DL
GLUCOSE BLD STRIP.AUTO-MCNC: 103 MG/DL (ref 65–100)
GLUCOSE BLD STRIP.AUTO-MCNC: 193 MG/DL (ref 65–100)
HBA1C MFR BLD: 5.6 % (ref 4.8–5.6)
SERVICE CMNT-IMP: ABNORMAL
SERVICE CMNT-IMP: ABNORMAL
SPECIMEN EXP DATE BLD: NORMAL

## 2023-09-14 PROCEDURE — 2500000003 HC RX 250 WO HCPCS: Performed by: ANESTHESIOLOGY

## 2023-09-14 PROCEDURE — 6360000002 HC RX W HCPCS: Performed by: ANESTHESIOLOGY

## 2023-09-14 PROCEDURE — 2580000003 HC RX 258: Performed by: ORTHOPAEDIC SURGERY

## 2023-09-14 PROCEDURE — 86850 RBC ANTIBODY SCREEN: CPT

## 2023-09-14 PROCEDURE — 3600000015 HC SURGERY LEVEL 5 ADDTL 15MIN: Performed by: ORTHOPAEDIC SURGERY

## 2023-09-14 PROCEDURE — 6360000002 HC RX W HCPCS: Performed by: NURSE ANESTHETIST, CERTIFIED REGISTERED

## 2023-09-14 PROCEDURE — 51798 US URINE CAPACITY MEASURE: CPT

## 2023-09-14 PROCEDURE — 86901 BLOOD TYPING SEROLOGIC RH(D): CPT

## 2023-09-14 PROCEDURE — 3700000001 HC ADD 15 MINUTES (ANESTHESIA): Performed by: ORTHOPAEDIC SURGERY

## 2023-09-14 PROCEDURE — 3700000000 HC ANESTHESIA ATTENDED CARE: Performed by: ORTHOPAEDIC SURGERY

## 2023-09-14 PROCEDURE — 94761 N-INVAS EAR/PLS OXIMETRY MLT: CPT

## 2023-09-14 PROCEDURE — 2709999900 HC NON-CHARGEABLE SUPPLY: Performed by: ORTHOPAEDIC SURGERY

## 2023-09-14 PROCEDURE — 7100000001 HC PACU RECOVERY - ADDTL 15 MIN: Performed by: ORTHOPAEDIC SURGERY

## 2023-09-14 PROCEDURE — 6370000000 HC RX 637 (ALT 250 FOR IP): Performed by: ANESTHESIOLOGY

## 2023-09-14 PROCEDURE — 82962 GLUCOSE BLOOD TEST: CPT

## 2023-09-14 PROCEDURE — 94760 N-INVAS EAR/PLS OXIMETRY 1: CPT

## 2023-09-14 PROCEDURE — 73030 X-RAY EXAM OF SHOULDER: CPT

## 2023-09-14 PROCEDURE — 64415 NJX AA&/STRD BRCH PLXS IMG: CPT | Performed by: ANESTHESIOLOGY

## 2023-09-14 PROCEDURE — 2720000010 HC SURG SUPPLY STERILE: Performed by: ORTHOPAEDIC SURGERY

## 2023-09-14 PROCEDURE — 6360000002 HC RX W HCPCS: Performed by: ORTHOPAEDIC SURGERY

## 2023-09-14 PROCEDURE — 2580000003 HC RX 258: Performed by: NURSE ANESTHETIST, CERTIFIED REGISTERED

## 2023-09-14 PROCEDURE — C1776 JOINT DEVICE (IMPLANTABLE): HCPCS | Performed by: ORTHOPAEDIC SURGERY

## 2023-09-14 PROCEDURE — 2500000003 HC RX 250 WO HCPCS: Performed by: NURSE ANESTHETIST, CERTIFIED REGISTERED

## 2023-09-14 PROCEDURE — 6370000000 HC RX 637 (ALT 250 FOR IP): Performed by: ORTHOPAEDIC SURGERY

## 2023-09-14 PROCEDURE — C1713 ANCHOR/SCREW BN/BN,TIS/BN: HCPCS | Performed by: ORTHOPAEDIC SURGERY

## 2023-09-14 PROCEDURE — G0378 HOSPITAL OBSERVATION PER HR: HCPCS

## 2023-09-14 PROCEDURE — 7100000000 HC PACU RECOVERY - FIRST 15 MIN: Performed by: ORTHOPAEDIC SURGERY

## 2023-09-14 PROCEDURE — 86900 BLOOD TYPING SEROLOGIC ABO: CPT

## 2023-09-14 PROCEDURE — 83036 HEMOGLOBIN GLYCOSYLATED A1C: CPT

## 2023-09-14 PROCEDURE — 3600000005 HC SURGERY LEVEL 5 BASE: Performed by: ORTHOPAEDIC SURGERY

## 2023-09-14 DEVICE — SCREW BNE L50MM DIA4.5MM PROX CORT TIB S STL ST LOK FULL: Type: IMPLANTABLE DEVICE | Site: SHOULDER | Status: FUNCTIONAL

## 2023-09-14 DEVICE — IMPLANTABLE DEVICE: Type: IMPLANTABLE DEVICE | Site: SHOULDER | Status: FUNCTIONAL

## 2023-09-14 DEVICE — DELTA XTEND LATERALIZED GLENOSPHERE +6MM ECCENTRIC 038MM
Type: IMPLANTABLE DEVICE | Site: SHOULDER | Status: FUNCTIONAL
Brand: DELTA XTEND

## 2023-09-14 DEVICE — SCREW BNE L36MM DIA4.5MM PROX CORT TIB S STL ST LOK FULL: Type: IMPLANTABLE DEVICE | Site: SHOULDER | Status: FUNCTIONAL

## 2023-09-14 DEVICE — SPACER HUM +9MM OFFSET SHLDR POLYETH FOR DELT XTEND REV SYS: Type: IMPLANTABLE DEVICE | Site: SHOULDER | Status: FUNCTIONAL

## 2023-09-14 DEVICE — DELTA XTEND REVERSE SHOULDER SYSTEM CENTRAL METAGLENE SCREW 6MM X 25MM
Type: IMPLANTABLE DEVICE | Site: SHOULDER | Status: FUNCTIONAL
Brand: DELTA XTEND

## 2023-09-14 DEVICE — GLOBAL UNITE PLATFORM SHOULDER SYSTEM REVERSE FX EPI CENTER POROCOAT
Type: IMPLANTABLE DEVICE | Site: SHOULDER | Status: FUNCTIONAL
Brand: GLOBAL UNITE

## 2023-09-14 DEVICE — CUP HUM DIA38MM +3MM OFFSET STD SHLDR POLYETH DELT XTEND: Type: IMPLANTABLE DEVICE | Site: SHOULDER | Status: FUNCTIONAL

## 2023-09-14 DEVICE — DELTA XTEND REVERSE SHOULDER SYSTEM CENTRAL SCREW METAGLENE COLLET IMPLANT: TITANIUM / HANDLE: POLYCARBONATE
Type: IMPLANTABLE DEVICE | Site: SHOULDER | Status: FUNCTIONAL
Brand: DELTA XTEND

## 2023-09-14 DEVICE — SCREW BNE L44MM DIA4.5MM PROX CORT TIB S STL ST LOK FULL: Type: IMPLANTABLE DEVICE | Site: SHOULDER | Status: FUNCTIONAL

## 2023-09-14 RX ORDER — ONDANSETRON 4 MG/1
4 TABLET, ORALLY DISINTEGRATING ORAL EVERY 8 HOURS PRN
Status: DISCONTINUED | OUTPATIENT
Start: 2023-09-14 | End: 2023-09-15 | Stop reason: HOSPADM

## 2023-09-14 RX ORDER — HYDROMORPHONE HYDROCHLORIDE 2 MG/1
4 TABLET ORAL
Status: DISCONTINUED | OUTPATIENT
Start: 2023-09-14 | End: 2023-09-15

## 2023-09-14 RX ORDER — OXYCODONE HYDROCHLORIDE 5 MG/1
5 TABLET ORAL PRN
Status: DISCONTINUED | OUTPATIENT
Start: 2023-09-14 | End: 2023-09-14 | Stop reason: HOSPADM

## 2023-09-14 RX ORDER — SODIUM CHLORIDE 0.9 % (FLUSH) 0.9 %
5-40 SYRINGE (ML) INJECTION EVERY 12 HOURS SCHEDULED
Status: DISCONTINUED | OUTPATIENT
Start: 2023-09-14 | End: 2023-09-14

## 2023-09-14 RX ORDER — FERROUS SULFATE 325(65) MG
325 TABLET ORAL 2 TIMES DAILY WITH MEALS
Status: DISCONTINUED | OUTPATIENT
Start: 2023-09-15 | End: 2023-09-15 | Stop reason: HOSPADM

## 2023-09-14 RX ORDER — DEXAMETHASONE SODIUM PHOSPHATE 10 MG/ML
INJECTION INTRAMUSCULAR; INTRAVENOUS PRN
Status: DISCONTINUED | OUTPATIENT
Start: 2023-09-14 | End: 2023-09-14 | Stop reason: SDUPTHER

## 2023-09-14 RX ORDER — SODIUM CHLORIDE 9 MG/ML
INJECTION, SOLUTION INTRAVENOUS PRN
Status: DISCONTINUED | OUTPATIENT
Start: 2023-09-14 | End: 2023-09-14 | Stop reason: HOSPADM

## 2023-09-14 RX ORDER — LIDOCAINE HYDROCHLORIDE 10 MG/ML
1 INJECTION, SOLUTION INFILTRATION; PERINEURAL
Status: DISCONTINUED | OUTPATIENT
Start: 2023-09-14 | End: 2023-09-14 | Stop reason: HOSPADM

## 2023-09-14 RX ORDER — SODIUM CHLORIDE 0.9 % (FLUSH) 0.9 %
5-40 SYRINGE (ML) INJECTION PRN
Status: DISCONTINUED | OUTPATIENT
Start: 2023-09-14 | End: 2023-09-14

## 2023-09-14 RX ORDER — SODIUM CHLORIDE 0.9 % (FLUSH) 0.9 %
5-40 SYRINGE (ML) INJECTION EVERY 12 HOURS SCHEDULED
Status: DISCONTINUED | OUTPATIENT
Start: 2023-09-14 | End: 2023-09-15 | Stop reason: HOSPADM

## 2023-09-14 RX ORDER — SODIUM CHLORIDE 0.9 % (FLUSH) 0.9 %
5-40 SYRINGE (ML) INJECTION PRN
Status: DISCONTINUED | OUTPATIENT
Start: 2023-09-14 | End: 2023-09-14 | Stop reason: HOSPADM

## 2023-09-14 RX ORDER — SODIUM CHLORIDE 9 MG/ML
INJECTION, SOLUTION INTRAVENOUS PRN
Status: DISCONTINUED | OUTPATIENT
Start: 2023-09-14 | End: 2023-09-15 | Stop reason: HOSPADM

## 2023-09-14 RX ORDER — DOCUSATE SODIUM 100 MG/1
100 CAPSULE, LIQUID FILLED ORAL 2 TIMES DAILY
Status: DISCONTINUED | OUTPATIENT
Start: 2023-09-15 | End: 2023-09-15 | Stop reason: HOSPADM

## 2023-09-14 RX ORDER — GABAPENTIN 100 MG/1
100 CAPSULE ORAL 3 TIMES DAILY
Status: DISCONTINUED | OUTPATIENT
Start: 2023-09-14 | End: 2023-09-15 | Stop reason: HOSPADM

## 2023-09-14 RX ORDER — OXYCODONE HYDROCHLORIDE 5 MG/1
10 TABLET ORAL PRN
Status: DISCONTINUED | OUTPATIENT
Start: 2023-09-14 | End: 2023-09-14 | Stop reason: HOSPADM

## 2023-09-14 RX ORDER — ENEMA 19; 7 G/133ML; G/133ML
1 ENEMA RECTAL
Status: DISCONTINUED | OUTPATIENT
Start: 2023-09-14 | End: 2023-09-15 | Stop reason: HOSPADM

## 2023-09-14 RX ORDER — ONDANSETRON 2 MG/ML
4 INJECTION INTRAMUSCULAR; INTRAVENOUS
Status: DISCONTINUED | OUTPATIENT
Start: 2023-09-14 | End: 2023-09-14 | Stop reason: HOSPADM

## 2023-09-14 RX ORDER — DEXAMETHASONE SODIUM PHOSPHATE 10 MG/ML
INJECTION, SOLUTION INTRAMUSCULAR; INTRAVENOUS
Status: COMPLETED | OUTPATIENT
Start: 2023-09-14 | End: 2023-09-14

## 2023-09-14 RX ORDER — FENTANYL CITRATE 50 UG/ML
100 INJECTION, SOLUTION INTRAMUSCULAR; INTRAVENOUS
Status: COMPLETED | OUTPATIENT
Start: 2023-09-14 | End: 2023-09-14

## 2023-09-14 RX ORDER — LIDOCAINE HYDROCHLORIDE 20 MG/ML
INJECTION, SOLUTION EPIDURAL; INFILTRATION; INTRACAUDAL; PERINEURAL PRN
Status: DISCONTINUED | OUTPATIENT
Start: 2023-09-14 | End: 2023-09-14 | Stop reason: SDUPTHER

## 2023-09-14 RX ORDER — SODIUM CHLORIDE 0.9 % (FLUSH) 0.9 %
5-40 SYRINGE (ML) INJECTION EVERY 12 HOURS SCHEDULED
Status: DISCONTINUED | OUTPATIENT
Start: 2023-09-14 | End: 2023-09-14 | Stop reason: HOSPADM

## 2023-09-14 RX ORDER — BISACODYL 10 MG
10 SUPPOSITORY, RECTAL RECTAL DAILY PRN
Status: CANCELLED | OUTPATIENT
Start: 2023-09-14

## 2023-09-14 RX ORDER — SODIUM CHLORIDE 0.9 % (FLUSH) 0.9 %
5-40 SYRINGE (ML) INJECTION PRN
Status: DISCONTINUED | OUTPATIENT
Start: 2023-09-14 | End: 2023-09-15 | Stop reason: HOSPADM

## 2023-09-14 RX ORDER — ALLOPURINOL 100 MG/1
100 TABLET ORAL 2 TIMES DAILY
Status: DISCONTINUED | OUTPATIENT
Start: 2023-09-14 | End: 2023-09-15 | Stop reason: HOSPADM

## 2023-09-14 RX ORDER — ALBUTEROL SULFATE 2.5 MG/3ML
2.5 SOLUTION RESPIRATORY (INHALATION) EVERY 6 HOURS PRN
Status: DISCONTINUED | OUTPATIENT
Start: 2023-09-14 | End: 2023-09-15

## 2023-09-14 RX ORDER — ACETAMINOPHEN 500 MG
1000 TABLET ORAL ONCE
Status: COMPLETED | OUTPATIENT
Start: 2023-09-14 | End: 2023-09-14

## 2023-09-14 RX ORDER — PROMETHAZINE HYDROCHLORIDE 25 MG/1
25 TABLET ORAL EVERY 4 HOURS PRN
Status: DISCONTINUED | OUTPATIENT
Start: 2023-09-14 | End: 2023-09-15 | Stop reason: HOSPADM

## 2023-09-14 RX ORDER — GLYCOPYRROLATE 0.2 MG/ML
INJECTION INTRAMUSCULAR; INTRAVENOUS PRN
Status: DISCONTINUED | OUTPATIENT
Start: 2023-09-14 | End: 2023-09-14 | Stop reason: SDUPTHER

## 2023-09-14 RX ORDER — SODIUM CHLORIDE, SODIUM LACTATE, POTASSIUM CHLORIDE, CALCIUM CHLORIDE 600; 310; 30; 20 MG/100ML; MG/100ML; MG/100ML; MG/100ML
INJECTION, SOLUTION INTRAVENOUS CONTINUOUS PRN
Status: DISCONTINUED | OUTPATIENT
Start: 2023-09-14 | End: 2023-09-14 | Stop reason: SDUPTHER

## 2023-09-14 RX ORDER — BUPIVACAINE HYDROCHLORIDE AND EPINEPHRINE 5; 5 MG/ML; UG/ML
INJECTION, SOLUTION EPIDURAL; INTRACAUDAL; PERINEURAL
Status: COMPLETED | OUTPATIENT
Start: 2023-09-14 | End: 2023-09-14

## 2023-09-14 RX ORDER — ONDANSETRON 2 MG/ML
INJECTION INTRAMUSCULAR; INTRAVENOUS PRN
Status: DISCONTINUED | OUTPATIENT
Start: 2023-09-14 | End: 2023-09-14 | Stop reason: SDUPTHER

## 2023-09-14 RX ORDER — PROPOFOL 10 MG/ML
INJECTION, EMULSION INTRAVENOUS PRN
Status: DISCONTINUED | OUTPATIENT
Start: 2023-09-14 | End: 2023-09-14 | Stop reason: SDUPTHER

## 2023-09-14 RX ORDER — SODIUM CHLORIDE, SODIUM LACTATE, POTASSIUM CHLORIDE, CALCIUM CHLORIDE 600; 310; 30; 20 MG/100ML; MG/100ML; MG/100ML; MG/100ML
INJECTION, SOLUTION INTRAVENOUS CONTINUOUS
Status: DISCONTINUED | OUTPATIENT
Start: 2023-09-14 | End: 2023-09-15 | Stop reason: HOSPADM

## 2023-09-14 RX ORDER — CARVEDILOL 6.25 MG/1
6.25 TABLET ORAL 2 TIMES DAILY
Status: DISCONTINUED | OUTPATIENT
Start: 2023-09-14 | End: 2023-09-15 | Stop reason: HOSPADM

## 2023-09-14 RX ORDER — EPHEDRINE SULFATE/0.9% NACL/PF 50 MG/5 ML
SYRINGE (ML) INTRAVENOUS PRN
Status: DISCONTINUED | OUTPATIENT
Start: 2023-09-14 | End: 2023-09-14 | Stop reason: SDUPTHER

## 2023-09-14 RX ORDER — NEOSTIGMINE METHYLSULFATE 1 MG/ML
INJECTION, SOLUTION INTRAVENOUS PRN
Status: DISCONTINUED | OUTPATIENT
Start: 2023-09-14 | End: 2023-09-14 | Stop reason: SDUPTHER

## 2023-09-14 RX ORDER — PROCHLORPERAZINE EDISYLATE 5 MG/ML
5 INJECTION INTRAMUSCULAR; INTRAVENOUS
Status: DISCONTINUED | OUTPATIENT
Start: 2023-09-14 | End: 2023-09-14 | Stop reason: HOSPADM

## 2023-09-14 RX ORDER — SODIUM CHLORIDE 9 MG/ML
INJECTION, SOLUTION INTRAVENOUS PRN
Status: DISCONTINUED | OUTPATIENT
Start: 2023-09-14 | End: 2023-09-14

## 2023-09-14 RX ORDER — HYDROMORPHONE HYDROCHLORIDE 2 MG/1
2 TABLET ORAL
Status: DISCONTINUED | OUTPATIENT
Start: 2023-09-14 | End: 2023-09-15

## 2023-09-14 RX ORDER — MIDAZOLAM HYDROCHLORIDE 2 MG/2ML
2 INJECTION, SOLUTION INTRAMUSCULAR; INTRAVENOUS
Status: COMPLETED | OUTPATIENT
Start: 2023-09-14 | End: 2023-09-14

## 2023-09-14 RX ORDER — ROCURONIUM BROMIDE 10 MG/ML
INJECTION, SOLUTION INTRAVENOUS PRN
Status: DISCONTINUED | OUTPATIENT
Start: 2023-09-14 | End: 2023-09-14 | Stop reason: SDUPTHER

## 2023-09-14 RX ADMIN — ACETAMINOPHEN 1000 MG: 500 TABLET, FILM COATED ORAL at 07:47

## 2023-09-14 RX ADMIN — APIXABAN 5 MG: 5 TABLET, FILM COATED ORAL at 21:21

## 2023-09-14 RX ADMIN — PROPOFOL 200 MG: 10 INJECTION, EMULSION INTRAVENOUS at 10:38

## 2023-09-14 RX ADMIN — Medication 10 MG: at 11:14

## 2023-09-14 RX ADMIN — ONDANSETRON 4 MG: 2 INJECTION INTRAMUSCULAR; INTRAVENOUS at 11:06

## 2023-09-14 RX ADMIN — HYDROMORPHONE HYDROCHLORIDE 2 MG: 2 TABLET ORAL at 15:09

## 2023-09-14 RX ADMIN — MIDAZOLAM 2 MG: 1 INJECTION INTRAMUSCULAR; INTRAVENOUS at 08:57

## 2023-09-14 RX ADMIN — PHENYLEPHRINE HYDROCHLORIDE 50 MCG: 0.1 INJECTION, SOLUTION INTRAVENOUS at 11:44

## 2023-09-14 RX ADMIN — LIDOCAINE HYDROCHLORIDE 70 MG: 20 INJECTION, SOLUTION EPIDURAL; INFILTRATION; INTRACAUDAL; PERINEURAL at 10:38

## 2023-09-14 RX ADMIN — SODIUM CHLORIDE, SODIUM LACTATE, POTASSIUM CHLORIDE, AND CALCIUM CHLORIDE: 600; 310; 30; 20 INJECTION, SOLUTION INTRAVENOUS at 11:19

## 2023-09-14 RX ADMIN — ROCURONIUM BROMIDE 40 MG: 50 INJECTION, SOLUTION INTRAVENOUS at 10:38

## 2023-09-14 RX ADMIN — Medication 3 MG: at 12:03

## 2023-09-14 RX ADMIN — DEXAMETHASONE SODIUM PHOSPHATE 5 MG: 10 INJECTION, SOLUTION INTRAMUSCULAR; INTRAVENOUS at 08:57

## 2023-09-14 RX ADMIN — Medication 5 MG: at 11:23

## 2023-09-14 RX ADMIN — SODIUM CHLORIDE, SODIUM LACTATE, POTASSIUM CHLORIDE, AND CALCIUM CHLORIDE: 600; 310; 30; 20 INJECTION, SOLUTION INTRAVENOUS at 10:24

## 2023-09-14 RX ADMIN — GLYCOPYRROLATE 0.4 MG: 0.2 INJECTION INTRAMUSCULAR; INTRAVENOUS at 12:03

## 2023-09-14 RX ADMIN — BUPIVACAINE HYDROCHLORIDE AND EPINEPHRINE BITARTRATE 20 ML: 5; .005 INJECTION, SOLUTION EPIDURAL; INTRACAUDAL; PERINEURAL at 08:57

## 2023-09-14 RX ADMIN — PHENYLEPHRINE HYDROCHLORIDE 50 MCG: 0.1 INJECTION, SOLUTION INTRAVENOUS at 11:23

## 2023-09-14 RX ADMIN — FENTANYL CITRATE 50 MCG: 50 INJECTION INTRAMUSCULAR; INTRAVENOUS at 08:57

## 2023-09-14 RX ADMIN — Medication 2000 MG: at 10:48

## 2023-09-14 RX ADMIN — CARVEDILOL 6.25 MG: 6.25 TABLET, FILM COATED ORAL at 21:22

## 2023-09-14 RX ADMIN — WATER 1000 MG: 1 INJECTION INTRAMUSCULAR; INTRAVENOUS; SUBCUTANEOUS at 18:00

## 2023-09-14 RX ADMIN — Medication 10 MG: at 11:06

## 2023-09-14 RX ADMIN — DEXAMETHASONE SODIUM PHOSPHATE 8 MG: 10 INJECTION INTRAMUSCULAR; INTRAVENOUS at 11:06

## 2023-09-14 ASSESSMENT — PAIN - FUNCTIONAL ASSESSMENT
PAIN_FUNCTIONAL_ASSESSMENT: ACTIVITIES ARE NOT PREVENTED
PAIN_FUNCTIONAL_ASSESSMENT: 0-10

## 2023-09-14 ASSESSMENT — PAIN SCALES - GENERAL
PAINLEVEL_OUTOF10: 3
PAINLEVEL_OUTOF10: 0
PAINLEVEL_OUTOF10: 3
PAINLEVEL_OUTOF10: 0

## 2023-09-14 ASSESSMENT — PAIN DESCRIPTION - LOCATION: LOCATION: SHOULDER

## 2023-09-14 ASSESSMENT — PAIN DESCRIPTION - DESCRIPTORS
DESCRIPTORS: ACHING;DULL
DESCRIPTORS: ACHING;PRESSURE

## 2023-09-14 ASSESSMENT — PAIN DESCRIPTION - ORIENTATION: ORIENTATION: LEFT

## 2023-09-14 NOTE — BRIEF OP NOTE
BRIEF OPERATIVE NOTE    Date of Procedure: 9/14/2023     Preoperative Diagnosis:  GLENOHUMERAL OSTEOARTHRITIS LEFT SHOULDER      BICEPS TENDINITIS LEFT SHOULDER    Postoperative Diagnosis:  SAME    Procedure(s)  REVERSE LEFT TOTAL SHOULDER ARTHROPLASTY WITH SHORT STEM PRESS FIT DELTA EXTEND PROSTHESIS, BICEPS TENODESIS    Surgeon(s) and Role:     * Albert Benítez MD - Primary         Assistant Staff:  NONE    Surgical Staff:  Tachoulator: January Gordon RN  Surgical Assistant: Tien Barnett  Scrub Person First: Mela Friday  Scrub Person Second: Keo Amin      * Missing procedure start or end time(s) *    Anesthesia:  GENERAL WITH INTERSCALENE BLOCK    Estimated Blood Loss: 100 CC. Complications: NONE    Implants:   Implant Name Type Inv. Item Serial No.  Lot No. LRB No. Used Action   2MM CENTRAL SCREW     [id number] Left 1 Implanted   SCREW BNE L50MM DIA4. 5MM PROX ISAEL TIB S [facility] ARCHIE FULL - [id number]  SCREW BNE L50MM DIA4. 5MM PROX ISAEL TIB S STL ST ARCHIE FULL  [employer]WD [id number] Left 1 Implanted   SCREW BNE L44MM DIA4. 5MM PROX ISAEL TIB S STL [facility] AR[employer]IE FULL - [id number]  SCREW BNE L44MM DIA4. 5MM PROX ISAEL TIB S STL [facility] ARCHIE FULL  [employer]WD [id number] Left 1 Implanted   SCREW BNE L36MM DIA4. 5MM PROX ISAEL TIB S STL [facility] ARCHIE FULL - [id number]  SCREW BNE L36MM DIA4. 5MM PROX ISAEL TIB S STL ST ARCHIE FULL  DEPU[employer]-WD [id number] Left 1 Implanted   SCREW BNE METAGLENE 6X25 MM CTRL STRL DELT XTEND LTX - [id number]  SCREW BNE METAGLENE 6X25 MM CTRL STRL DELT XTEND LTX  [facility] [employer] [facility] [id number] Left 1 Implanted   SCREW BNE METAGLENE CTRL CLLT STRL DELT XTEND LTX - [id number]  SCREW BNE METAGLENE CTRL CLLT STRL DELT XTEND LTX  [facility] [employer] [facility] [id number] Left 1 Implanted   XTND GLENO ECC D38MM +6MM - [id number]  XTND GLENO ECC D38MM +6MM  [facility] [employer]- [id number] Left 1 Implanted   COMPONENT HUMERAL GLOBAL UNITE FX RSA EPI

## 2023-09-14 NOTE — PERIOP NOTE
TRANSFER - OUT REPORT:    Verbal report given to Karin RN on Radha Barnett  being transferred to [facility] for routine post-op       Report consisted of patient's Situation, Background, Assessment and   Recommendations(SBAR). Information from the following report(s) Nurse Handoff Report was reviewed with the receiving nurse. Lines:   Peripheral IV 09/14/23 Posterior;Right Arm (Active)   Site Assessment Clean, dry & intact 09/14/23 1218   Line Status Infusing 09/14/23 [address] Connections checked and tightened 09/14/23 1218   Phlebitis Assessment No symptoms 09/14/23 1218   Infiltration Assessment 0 09/14/23 1218   Alcohol Cap Used No 09/14/23 1218   Dressing Status Clean, dry & intact 09/14/23 1218   Dressing Type Transparent 09/14/23 1218        Opportunity for questions and clarification was provided.       Patient transported with:  O2 @ 2lpm

## 2023-09-14 NOTE — DISCHARGE SUMMARY
[address] Discharge Summary      Patient ID:  Bob Dubois  [id number]  78 y.o.  1955    Allergies: Patient has no known allergies. Admit date: 9/14/2023    Discharge date and time: 9/15/2023    Admitting Physician: Candido Cotton MD     Discharge Physician: Candido Denson MD      * Admission Diagnoses: Osteoarthritis of left glenohumeral joint [M19.012]  Degenerative joint disease of left acromioclavicular joint [M19.012]    * Discharge Diagnoses: Principal Problem:    Osteoarthritis of left glenohumeral joint  Active Problems:    Bicipital tendinitis of left shoulder    Degenerative joint disease of left acromioclavicular joint    Essential hypertension    Persistent atrial fibrillation ([address])  Resolved Problems:    * No resolved hospital problems. *      Surgeon: Candido Denson MD          * Procedure: Procedure(s) with comments:  left reverse total shoulder arthroplasty with a delta xtend prosthesis with central screw and biceps tenodesis in combination with a latissimus dorsi and teres major tendon transfer. general interscalene. 23hr observation - interscalene block           Perioperative Antibiotics: Ancef  _x__                                                Vancomycin  ___        Post Op complications: none        * Discharge Condition: Good  Wound appears to be healing without any evidence of infection.          * Discharged to: Home    * Follow-up Care/Discharge instructions:  - Resume pre hospital diet            - Resume home medications per medical continuation form     CONTINUE PHYSICAL THERAPY  Sling left shoulder  - Follow up in office as scheduled       Signed:  Candido Denson MD  9/15/2023  6:30 AM

## 2023-09-14 NOTE — ANESTHESIA PROCEDURE NOTES
Peripheral Block    Patient location during procedure: pre-op  Reason for block: post-op pain management and at surgeon's request  Start time: 9/14/2023 8:57 AM  End time: 9/14/2023 8:59 AM  Staffing  Anesthesiologist: Jose Eduardo Schneider MD  Performed by: Jose Eduardo Schneider MD  Authorized by: Jose Eduardo Schneider MD    Preanesthetic Checklist  Completed: patient identified, IV checked, site marked, risks and benefits discussed, surgical/procedural consents, equipment checked, pre-op evaluation, timeout performed, anesthesia consent given, oxygen available, monitors applied/VS acknowledged, fire risk safety assessment completed and verbalized and blood product R/B/A discussed and consented  Peripheral Block   Patient position: supine  Prep: ChloraPrep  Provider prep: sterile gloves and mask  Patient monitoring: responsive to questions, oxygen, IV access, frequent blood pressure checks, continuous pulse ox and cardiac monitor  Block type: Brachial plexus  Interscalene  Laterality: left  Injection technique: single-shot  Guidance: ultrasound guided    Needle   Needle type: insulated echogenic nerve stimulator needle   Needle gauge: 21 G  Needle localization: ultrasound guidance  Needle length: 5 cm  Assessment   Injection assessment: negative aspiration for heme, no paresthesia on injection, local visualized surrounding nerve on ultrasound, no intravascular symptoms and low pressure verified by pressure monitor  Paresthesia pain: none  Slow fractionated injection: yes  Hemodynamics: stable  Real-time US image taken/store: yes  Outcomes: uncomplicated and patient tolerated procedure well    Medications Administered  bupivacaine 0.5%-EPINEPHrine injection 1:200000 - Perineural   20 mL - 9/14/2023 8:57:00 AM  dexamethasone (DECADRON) (PF) 10 mg/mL injection - Other   5 mg - 9/14/2023 8:57:00 AM

## 2023-09-14 NOTE — PROGRESS NOTES
TRANSFER - IN REPORT:    Verbal report received from Chikis RN on Hima Gavel  being received from PACU for routine post-op      Report consisted of patient's Situation, Background, Assessment and   Recommendations(SBAR). Information from the following report(s) Nurse Handoff Report, Intake/Output, and MAR was reviewed with the receiving nurse. Opportunity for questions and clarification was provided.

## 2023-09-14 NOTE — PROGRESS NOTES
1324 Patient arrived to room. Wife at bedside. Yellow gripper socks on. 2+ left radial pulse. Arm in sling and pillow underneath sling. Alert and oriented x4. Denies pain    1433 Patient sitting up in bed eating. Wife at bedside. CPAP at bedside. 1645 placed O2 monitor on patient. Educated him on side effects of pain medicine. 1719 Patient resting with eyes closed. 02 99% 2L NC.  Pulse is 96

## 2023-09-14 NOTE — PROGRESS NOTES
09/14/23 1458   Treatment   Treatment Type IS   Oxygen Therapy/Pulse Ox   O2 Therapy Oxygen   O2 Device Nasal cannula   O2 Flow Rate (L/min) 2 L/min   Pulse 85   Respirations 17   SpO2 98 %   Pulse Oximeter Device Mode Intermittent   $Pulse Oximeter $Spot check (single)   Incentive Spirometry Tx   Treatment Effort Assisted by RT

## 2023-09-14 NOTE — H&P
Update History & Physical    The patient's History and Physical of August 29, 2023 was reviewed with the patient and I examined the patient. There was no change. The surgical site was confirmed by the patient and me. Plan: The risks, benefits, expected outcome, and alternative to the recommended procedure have been discussed with the patient. Patient understands and wants to proceed with the procedure.      Electronically signed by Jessenia Au MD on 9/14/2023 at 6:23 AM

## 2023-09-14 NOTE — PROGRESS NOTES
RRT Clinical Rounding Nurse Progress Report      SUBJECTIVE: Patient assessed secondary to RN/provider concern - lisa hunter.      Vitals:    09/14/23 1509 09/14/23 1539 09/14/23 1817 09/14/23 1818   BP:   99/69 105/72   Pulse:   78 74   Resp: 18 20 14 14   Temp:       TempSrc:       SpO2:   97% 95%   Weight:       Height:            DETERIORATION INDEX SCORE: 20    ASSESSMENT:  found patient to be alert speech clear but pale and dry. 12 lead ecg shows a fib with rapid response with ventricular rate of 120  in and out cath of 400 mls but prostate noted     PLAN:  Will follow per RRT Clinical Rounding Program protocol.     Elder Mckay RN  [place]: [address] [place] [address] Ave: [phone]

## 2023-09-14 NOTE — OP NOTE
[employer]  OPERATIVE REPORT    Name:  Cole Hines  MR#:  [id number]  :  1955  ACCOUNT #:  [de-identified]  DATE OF SERVICE:  2023    PREOPERATIVE DIAGNOSES:  1.  End-stage glenohumeral osteoarthritis, left shoulder. 2.  Biceps tendinitis, left shoulder. POSTOPERATIVE DIAGNOSES:  1.  End-stage glenohumeral osteoarthritis, left shoulder. 2.  Biceps tendinitis, left shoulder. PROCEDURE PERFORMED:  Reverse left total shoulder arthroplasty with a short stem press-fit Delta Xtend prosthesis, biceps tenodesis. SURGEON:  Fernanda Hubbard. Anna Lovett MD        ANESTHESIA:  General with an interscalene block. COMPLICATIONS:  None. IMPLANTS:  Hardware utilized is Depuy central screw +2 mm lateralized, 38 eccentric +6 mm lateralized glenosphere, 38+3 cup, +9 extender, 14 short stem press-fit, 44 mm superior, 50 mm inferior, 36 mm anterior nonlocking cortical screws, and 25 mm central screw. ESTIMATED BLOOD LOSS:  100 mL. FINDINGS:  1. Severe end-stage glenohumeral osteoarthritis. 2.  Biceps tendinitis. CPT CODES:  [id number] and [id number] with a modifier 59. ICD-10 CODES:  M19.012, M75.22. INDICATIONS:  The patient is a 76-year-old gentleman with a history of atrial fibrillation on Eliquis with severe left shoulder pain. Preoperative physical exam and radiographs demonstrate severe end-stage glenohumeral osteoarthritis with a marked deformity. The patient has exhausted nonoperative modalities and following preoperative medical clearance, he was brought to the operative suite for operative intervention. PROCEDURE:  Following identification of the patient, the patient was taken to the operative suite. Following administration of general anesthesia, interscalene block for postop pain control, 2 g of IV Ancef, the patient was very carefully positioned on the operative table in the beach-chair fashion. Left shoulder was then prepped and draped in the sterile fashion.   Standard deltopectoral incision was identified and marked. Skin was incised. Subcutaneous tissue was then dissected down to the cephalic vein. This was dissected proximally and distally, retracted laterally with deltoid. Pec major and strap muscles were retracted medially. The subdeltoid bursa was released and the axillary nerve was protected. Biceps tendon was identified, dissected up through rotator interval, tagged, transected for later tenodesis. It was markedly tendinopathic. Subscapularis was then released off the lesser tuberosity in a subscapularis peel configuration and tagged for later repair. Humerus was then very carefully dislocated from the wound. There was marked glenohumeral osteoarthritis with severe degeneration of the humeral head and a Walch A2 glenoid. Proximal cutting guide was assembled. Proximal cut was then made. Circumferential osteophytes were then resected. Glenoid retractor was then inserted. Glenoid was then meticulously exposed. Circumferential osteophytes were then resected. Starter wire was drilled. Glenoid was then drilled and reamed. The high side was reamed down. The inferior tilt was reamed in.  A +2 mm lateralized central screw was then placed and secured with a 25 mm central screw, 44 mm superior, 50 mm inferior, and 36 mm anterior nonlocking cortical screw. The 2 mm lateralized metaglene was stable. A 38 eccentric +6 mm lateralized glenosphere was inserted in the standard fashion. Metaglene and glenosphere were stable. The humerus was dislocated back from the wound and reamed to accommodate a 14 press-fit. It was noted that a 14 press-fit with +9 extender and +3 cup gave excellent stability and mobility. At this point, all trial components were then removed. The humeral canal was irrigated and dried. The 14 press-fit component was assembled on the back table and was press-fit into the humeral shaft. There was excellent stability of the humeral prosthesis.

## 2023-09-14 NOTE — ANESTHESIA PROCEDURE NOTES
Airway  Date/Time: 9/14/2023 10:38 AM  Urgency: elective      General Information and Staff    Patient location during procedure: OR  Performed: resident/CRNA   Performed by: ANDRIY Hamm - CRNA  Authorized by: Evangelina Hawkins MD      Indications and Patient Condition  Indications for airway management: anesthesia and airway protection  Spontaneous Ventilation: absent  Sedation level: deep  Preoxygenated: yes  Patient position: sniffing  MILS not maintained throughout  Mask difficulty assessment: vent by bag mask    Final Airway Details  Final airway type: endotracheal airway      Successful airway: ETT  Cuffed: yes   Successful intubation technique: direct laryngoscopy  Facilitating devices/methods: intubating stylet  Endotracheal tube insertion site: oral  Blade: Trino  Blade size: #3  ETT size (mm): 7.0  Cormack-Lehane Classification: grade IIb - view of arytenoids or posterior of glottis only  Placement verified by: chest auscultation and capnometry   Measured from: lips  ETT to lips (cm): 23  Number of attempts at approach: 1

## 2023-09-14 NOTE — ANESTHESIA POSTPROCEDURE EVALUATION
Department of Anesthesiology  Postprocedure Note    Patient: Merrick Denney  MRN: [id number]  YOB: 1955  Date of evaluation: 9/14/2023      Procedure Summary     Date: 09/14/23 [place] [facility] [place] [facility] [place]    Anesthesia Start: 1024 Anesthesia Stop: 1218    Procedure: REVERSE LEFT TOTAL SHOULDER ARTHROPLASTY WITH SHORT STEM PRESS FIT DELTA EXTEND PROSTHESIS, BICEPS TENODESIS (Left: Shoulder) Diagnosis:       Osteoarthritis of left glenohumeral joint      Degenerative joint disease of left acromioclavicular joint      (Osteoarthritis of left glenohumeral joint [M19.012])      (Degenerative joint disease of left acromioclavicular joint [M19.012])    Surgeons: Sim Sánchez MD Responsible Provider: Emmaline Apley, MD    Anesthesia Type: General, Regional ASA Status: 3          Anesthesia Type: General, Regional    Mirza Phase I: Mirza Score: 9    Mirza Phase II:        Anesthesia Post Evaluation    Patient location during evaluation: PACU  Patient participation: complete - patient participated  Level of consciousness: awake and alert  Pain score: 1  Airway patency: patent  Nausea & Vomiting: no nausea  Complications: no  Cardiovascular status: blood pressure returned to baseline and hemodynamically stable  Respiratory status: acceptable  Hydration status: euvolemic  Multimodal analgesia pain management approach  Pain management: adequate and satisfactory to patient

## 2023-09-15 VITALS
TEMPERATURE: 97.5 F | RESPIRATION RATE: 18 BRPM | WEIGHT: 226.8 LBS | OXYGEN SATURATION: 96 % | DIASTOLIC BLOOD PRESSURE: 71 MMHG | HEIGHT: 71 IN | HEART RATE: 80 BPM | SYSTOLIC BLOOD PRESSURE: 112 MMHG | BODY MASS INDEX: 31.75 KG/M2

## 2023-09-15 DIAGNOSIS — Z47.1 AFTERCARE FOLLOWING LEFT SHOULDER JOINT REPLACEMENT SURGERY: Primary | ICD-10-CM

## 2023-09-15 DIAGNOSIS — Z96.612 AFTERCARE FOLLOWING LEFT SHOULDER JOINT REPLACEMENT SURGERY: Primary | ICD-10-CM

## 2023-09-15 PROBLEM — I10 ESSENTIAL HYPERTENSION: Status: ACTIVE | Noted: 2020-11-18

## 2023-09-15 PROBLEM — I42.0 DILATED CARDIOMYOPATHY (HCC): Status: ACTIVE | Noted: 2023-09-15

## 2023-09-15 LAB
ANION GAP SERPL CALC-SCNC: 4 MMOL/L (ref 2–11)
BUN SERPL-MCNC: 30 MG/DL (ref 8–23)
CALCIUM SERPL-MCNC: 8.7 MG/DL (ref 8.3–10.4)
CHLORIDE SERPL-SCNC: 108 MMOL/L (ref 101–110)
CO2 SERPL-SCNC: 27 MMOL/L (ref 21–32)
CREAT SERPL-MCNC: 0.85 MG/DL (ref 0.8–1.5)
ERYTHROCYTE [DISTWIDTH] IN BLOOD BY AUTOMATED COUNT: 12.8 % (ref 11.9–14.6)
GLUCOSE SERPL-MCNC: 144 MG/DL (ref 65–100)
HCT VFR BLD AUTO: 32.8 % (ref 41.1–50.3)
HGB BLD-MCNC: 10.9 G/DL (ref 13.6–17.2)
MAGNESIUM SERPL-MCNC: 2.2 MG/DL (ref 1.8–2.4)
MCH RBC QN AUTO: 32.5 PG (ref 26.1–32.9)
MCHC RBC AUTO-ENTMCNC: 33.2 G/DL (ref 31.4–35)
MCV RBC AUTO: 97.9 FL (ref 82–102)
NRBC # BLD: 0 K/UL (ref 0–0.2)
PLATELET # BLD AUTO: 139 K/UL (ref 150–450)
PMV BLD AUTO: 9 FL (ref 9.4–12.3)
POTASSIUM SERPL-SCNC: 4.5 MMOL/L (ref 3.5–5.1)
RBC # BLD AUTO: 3.35 M/UL (ref 4.23–5.6)
SODIUM SERPL-SCNC: 139 MMOL/L (ref 133–143)
WBC # BLD AUTO: 12.8 K/UL (ref 4.3–11.1)

## 2023-09-15 PROCEDURE — G0378 HOSPITAL OBSERVATION PER HR: HCPCS

## 2023-09-15 PROCEDURE — 2580000003 HC RX 258: Performed by: ORTHOPAEDIC SURGERY

## 2023-09-15 PROCEDURE — 36415 COLL VENOUS BLD VENIPUNCTURE: CPT

## 2023-09-15 PROCEDURE — 85027 COMPLETE CBC AUTOMATED: CPT

## 2023-09-15 PROCEDURE — 80048 BASIC METABOLIC PNL TOTAL CA: CPT

## 2023-09-15 PROCEDURE — 83735 ASSAY OF MAGNESIUM: CPT

## 2023-09-15 PROCEDURE — 6370000000 HC RX 637 (ALT 250 FOR IP): Performed by: ORTHOPAEDIC SURGERY

## 2023-09-15 PROCEDURE — 97530 THERAPEUTIC ACTIVITIES: CPT

## 2023-09-15 PROCEDURE — 94760 N-INVAS EAR/PLS OXIMETRY 1: CPT

## 2023-09-15 PROCEDURE — 97161 PT EVAL LOW COMPLEX 20 MIN: CPT

## 2023-09-15 PROCEDURE — 6360000002 HC RX W HCPCS: Performed by: ORTHOPAEDIC SURGERY

## 2023-09-15 RX ORDER — LEVALBUTEROL INHALATION SOLUTION 0.63 MG/3ML
0.63 SOLUTION RESPIRATORY (INHALATION) EVERY 6 HOURS PRN
Status: DISCONTINUED | OUTPATIENT
Start: 2023-09-15 | End: 2023-09-15 | Stop reason: HOSPADM

## 2023-09-15 RX ORDER — OXYCODONE HYDROCHLORIDE 5 MG/1
5 TABLET ORAL
Status: DISCONTINUED | OUTPATIENT
Start: 2023-09-15 | End: 2023-09-15 | Stop reason: HOSPADM

## 2023-09-15 RX ORDER — PROMETHAZINE HYDROCHLORIDE 25 MG/1
25 TABLET ORAL EVERY 6 HOURS PRN
Qty: 20 TABLET | Refills: 0 | Status: SHIPPED | OUTPATIENT
Start: 2023-09-15

## 2023-09-15 RX ORDER — 0.9 % SODIUM CHLORIDE 0.9 %
1000 INTRAVENOUS SOLUTION INTRAVENOUS ONCE
Status: DISCONTINUED | OUTPATIENT
Start: 2023-09-15 | End: 2023-09-15 | Stop reason: HOSPADM

## 2023-09-15 RX ORDER — GABAPENTIN 100 MG/1
100 CAPSULE ORAL 3 TIMES DAILY
Qty: 90 CAPSULE | Refills: 0 | Status: SHIPPED | OUTPATIENT
Start: 2023-09-15 | End: 2023-10-15

## 2023-09-15 RX ORDER — NALOXONE HYDROCHLORIDE 4 MG/.1ML
1 SPRAY NASAL PRN
Qty: 1 EACH | Refills: 0 | Status: SHIPPED | OUTPATIENT
Start: 2023-09-15

## 2023-09-15 RX ORDER — OXYCODONE HYDROCHLORIDE 10 MG/1
10 TABLET ORAL EVERY 6 HOURS PRN
Qty: 40 TABLET | Refills: 0 | Status: SHIPPED | OUTPATIENT
Start: 2023-09-15 | End: 2023-09-25

## 2023-09-15 RX ORDER — HYDROMORPHONE HYDROCHLORIDE 2 MG/1
2 TABLET ORAL EVERY 6 HOURS PRN
Qty: 40 TABLET | Refills: 0 | Status: SHIPPED | OUTPATIENT
Start: 2023-09-15 | End: 2023-09-25

## 2023-09-15 RX ORDER — OXYCODONE HYDROCHLORIDE 5 MG/1
10 TABLET ORAL
Status: DISCONTINUED | OUTPATIENT
Start: 2023-09-15 | End: 2023-09-15 | Stop reason: HOSPADM

## 2023-09-15 RX ADMIN — GABAPENTIN 100 MG: 100 CAPSULE ORAL at 08:55

## 2023-09-15 RX ADMIN — CARVEDILOL 6.25 MG: 6.25 TABLET, FILM COATED ORAL at 08:55

## 2023-09-15 RX ADMIN — WATER 1000 MG: 1 INJECTION INTRAMUSCULAR; INTRAVENOUS; SUBCUTANEOUS at 03:31

## 2023-09-15 RX ADMIN — APIXABAN 5 MG: 5 TABLET, FILM COATED ORAL at 08:55

## 2023-09-15 RX ADMIN — OXYCODONE HYDROCHLORIDE 5 MG: 5 TABLET ORAL at 11:56

## 2023-09-15 RX ADMIN — FERROUS SULFATE TAB 325 MG (65 MG ELEMENTAL FE) 325 MG: 325 (65 FE) TAB at 08:55

## 2023-09-15 RX ADMIN — ALLOPURINOL 100 MG: 100 TABLET ORAL at 08:55

## 2023-09-15 RX ADMIN — DOCUSATE SODIUM 100 MG: 100 CAPSULE, LIQUID FILLED ORAL at 08:55

## 2023-09-15 RX ADMIN — HYDROMORPHONE HYDROCHLORIDE 2 MG: 2 TABLET ORAL at 03:32

## 2023-09-15 ASSESSMENT — PAIN DESCRIPTION - DESCRIPTORS
DESCRIPTORS: ACHING
DESCRIPTORS: ACHING

## 2023-09-15 ASSESSMENT — PAIN SCALES - GENERAL
PAINLEVEL_OUTOF10: 3
PAINLEVEL_OUTOF10: 1

## 2023-09-15 ASSESSMENT — PAIN DESCRIPTION - LOCATION
LOCATION: SHOULDER
LOCATION: SHOULDER

## 2023-09-15 ASSESSMENT — PAIN DESCRIPTION - ORIENTATION
ORIENTATION: LEFT
ORIENTATION: LEFT

## 2023-09-15 NOTE — CARE COORDINATION
CM spoke w/ patient at bedside. MD order rec'd to arrange home health. Pt declines home health and would like to go directly to outpatient therapy. He requests [facility] at [address] [facility], [facility], if possible. WENDI spoke w/ nursing staff who will contact surgeon's office to have arranged. Will follow until d/c.

## 2023-09-15 NOTE — DISCHARGE INSTRUCTIONS
Resume pre hospital diet. Okay to shower. No driving until cleared by MD.  Use stool softeners/laxatives while taking narcotics. Continue exercises as taught by PT. Keep scheduled follow up appointment. Call office if temp>101, increased redness or drainage at incision site, or persistent nausea/vomitting. Shoulder Replacement Surgery: What to Expect at Home  Your Recovery     Shoulder replacement surgery replaces the worn parts of your shoulder joint. When you leave the hospital, your arm will be in a sling. It will be helpful if there is someone to help you at home for the next few weeks or until you havemore energy and can move around better. You will go home with a bandage and stitches, staples, skin glue, or tape strips. You can remove the bandage when your doctor tells you to. If you have staples, your doctor will remove them in 10 to 21 days. If you have stitches that are not the type that dissolve, your doctor will remove them in 10 to 14 days. Glue or tape strips will fall off on their own over time. You may still have some mild pain, and the area may be swollen for several months aftersurgery. Your doctor will give you medicine for the pain. A physical therapist will show you what exercises to do at home. You will continue the rehabilitation program (rehab) you started in the hospital. The better you do with your rehab exercises, the sooner you will get your strength and movement back. Depending on your job, you may be able to go back to work as early as 2 to 3 weeks after surgery, as long as you avoid certain arm movements, such as lifting. It takes at least 6 months to return tofull activity. In the future, make sure to let all health professionals know about yourartificial shoulder so they will know how to care for you. This care sheet gives you a general idea about how long it will take for you to recover. But each person recovers at a different pace.  Follow the steps belowto get as directed. If the doctor gave you a prescription medicine for pain, take it as prescribed. If you are not taking a prescription pain medicine, ask your doctor if you can take an over-the-counter medicine. If you think your pain medicine is making you sick to your stomach: Take your medicine after meals (unless your doctor has told you not to). Ask your doctor for a different pain medicine. If your doctor prescribed antibiotics, take them as directed. Don't stop taking them just because you feel better. You need to take the full course of antibiotics. If you take a blood thinner, be sure you get instructions about how to take your medicine safely. Blood thinners can cause serious bleeding problems. Incision care    If your doctor told you how to care for your cut (incision), follow your doctor's instructions. You will have a dressing over the cut. A dressing helps the incision heal and protects it. Your doctor will tell you how to take care of this. If you did not get instructions, follow this general advice: If you have strips of tape on the cut the doctor made, leave the tape on for a week or until it falls off. If you have stitches or staples, your doctor will tell you when to come back to have them removed. If you have skin glue on the cut, leave it on until it falls off. Skin glue is also called skin adhesive or liquid stitches. Change the bandage every day. Wash the area daily with warm water, and pat it dry. Don't use hydrogen peroxide or alcohol. They can slow healing. You may cover the area with a gauze bandage if it oozes fluid or rubs against clothing. You may shower 24 to 48 hours after surgery. Pat the incision dry. Don't swim or take a bath for the first 2 weeks, or until your doctor tells you it is okay. Exercise    Shoulder rehabilitation is a series of exercises you do after your surgery. This helps you get back your shoulder's range of motion and strength.  You will

## 2023-09-15 NOTE — CONSULTS
[facility] Hospitalist Consult   Admit Date:  2023  7:14 AM   Name:  Melisa Madrid   Age:  78 y.o. Sex:  male  :  1955   MRN:  [id number]   Room:  [facility]/    Presenting/Chief Complaint: No chief complaint on file. Reason(s) for Admission: Osteoarthritis of left glenohumeral joint [M19.012]  Degenerative joint disease of left acromioclavicular joint [M19.012]     Hospitalists consulted by Joaquina Blood MD for: A-fib    History of Presenting Illness:   Melisa Madrid is a 78 y.o. male with history of hypertension and persistent atrial fibrillation, who presented for surgery of his left shoulder. I was called by his nurse a little after midnight regarding his heart rate staying tachycardic. It is very often been in the 120s to 150s. It does not sustain at these rates and does drop down to the low 100s, but rarely below 100. There was an EKG done earlier which showed his rate to be A-fib with a rate of 148. The patient has been checked on several times tonight due to his heart rate and each time he has stated that he has no symptoms and that this is normal for him. I went to see him along with the ICU [place] at this time. He again, was resting comfortably in bed with his CPAP on. He reported that this is completely normal for him and he has no symptoms. He states that he does see his cardiologist as scheduled and his cardiologist is aware of these episodes where his heart rate stays this fast.  He states he was told that it was okay as long as he was not having any symptoms. He is not interested in us providing any treatment for this heart rate at this time. He says if he becomes symptomatic he will definitely let us know. His nurse believes that he may be discharged tomorrow.       Assessment & Plan:     Principal Problem:    Osteoarthritis of left glenohumeral joint  Plan: Per primary  Active Problems:    Degenerative joint disease of left acromioclavicular joint  Plan: Per primary nontender, nondistended. Extremities: No cyanosis or clubbing. No edema  Skin:     No rashes and normal coloration. Warm and dry. Neuro:  CN II-XII grossly intact. Sensation intact. Psych:  Normal mood and affect. I have personally reviewed labs and tests showing:  Recent Labs:  Recent Results (from the past 24 hour(s))   Hemoglobin A1C    Collection Time: 09/14/23  8:00 AM   Result Value Ref Range    Hemoglobin A1C 5.6 4.8 - 5.6 %    eAG 114 mg/dL   TYPE AND SCREEN    Collection Time: 09/14/23  8:11 AM   Result Value Ref Range    Crossmatch expiration date 09/17/2023,2359     ABO/Rh B NEGATIVE     Antibody Screen NEG    POCT Glucose    Collection Time: 09/14/23  8:17 AM   Result Value Ref Range    POC Glucose 103 (H) 65 - 100 mg/dL    Performed by: Melina    POCT Glucose    Collection Time: 09/14/23  6:11 PM   Result Value Ref Range    POC Glucose 193 (H) 65 - 100 mg/dL    Performed by: Lauren Patino        I have personally reviewed imaging studies showing:  XR SHOULDER LEFT (MIN 2 VIEWS)    Result Date: 9/14/2023  EXAMINATION: XR SHOULDER LEFT (MIN 2 VIEWS) 9/14/2023 12:24 PM ACCESSION NUMBER: [id number] COMPARISON: None available INDICATION: Postoperative evaluation after left shoulder arthroplasty TECHNIQUE: 2 views of the left shoulder were obtained. FINDINGS: Postsurgical changes the components are of interval left reverse total shoulder arthroplasty. The components are intact and align appropriately provided views. No discrete fracture. The acromioclavicular joint is aligned appropriately. Soft tissue gas about the left shoulder, likely postsurgical. Left lung hypoinflation prominent pulmonary markings in the left lung.     -Interval reverse left total shoulder arthroplasty without evidence of immediate hardware application. -Hypoinflation of the left lung with prominent pulmonary markings, which may reflect atelectasis or possibly mild pulmonary edema.       Echocardiogram:  No results

## 2023-09-15 NOTE — PROGRESS NOTES
09/14/23 2047   Treatment   Treatment Type IS   Oxygen Therapy/Pulse Ox   O2 Device PAP (positive airway pressure)   Pulse 90   Respirations 18   SpO2 95 %   Skin Assessment Clean, dry, & intact   Humidification Source   (H2O chamber)   Pulse Oximeter Device Mode Continuous   Pulse Oximeter Device Location Right;Finger   Incentive Spirometry Tx   Treatment Effort Assisted by RT; Well   Achieved Volume (mL) 2000 mL     Patient on continuous sat monitor . Alarms set per protocol. Patient working on IS achieving 2000 ml. Very good effort, technique. Pt placed on his home CPAP +14 cm H2O press. Pt comfortable and going to sleep at this time.

## 2023-09-15 NOTE — CONSULTS
NONBILLABLE NOTE: pt seen after midnight by the night attending Dr Chrystal Daley. HPI: Can Santana is a 78 y.o. male with history of hypertension and persistent atrial fibrillation, who takes eliquis and coreg. He has arthritis and underwent Left shoulder surgery with Dr Juma Cleaning 9/14/23. Pt had afib uncontrolled last night, into the 140s but was asymptomatic when event occurred. He took his coreg yesterday am prior to surgery, had it last night at 211 and again this am at 0855. Pt is on eliquis and has a regular cardiologist out pt. He declined treatment last night and the episode resolved on its own. This am his HR is in the 80s, /71 and remains asymptomatic. Pt declines seeing cards here, having any further work up and wishes to DC home. Ortho has entered the DC orders. Pt will take his coreg and eliquis as prescribed and see his own cardiologist. Wife at bedside for discussion. Attending Dr Lianne Kelsey aware pt is discharging.      Margaret Estrada NP  [facility] [place] [facility]

## 2023-09-15 NOTE — PROGRESS NOTES
ACUTE PHYSICAL THERAPY GOALS:   (Developed with and agreed upon by patient and/or caregiver.)  GOALS (1-4 days):  (1.)  Patient will move from supine to sit and sit to supine  in bed with STAND BY ASSIST.    (2.)  Patient will transfer from bed to chair and chair to bed with SUPERVISION using the least restrictive device. (3.)  Patient will ambulate with SUPERVISION for 100 feet with the least restrictive device. (4.)  Patient will be independent with shoulder HEP to increase range of motion per MD orders.   ________________________________________________________________________________________________      PHYSICAL THERAPY: SHOULDER Initial Assessment and AM  (Link to Caseload Tracking: PT Visit Days : 1  Acknowledge Orders Time In/Out  PT Charge Capture  Rehab Caseload Tracker    Dara Eckert is a 78 y.o. male   PRIMARY DIAGNOSIS: Osteoarthritis of left glenohumeral joint  Osteoarthritis of left glenohumeral joint [M19.012]  Degenerative joint disease of left acromioclavicular joint [M19.012]  Procedure(s) (LRB):  REVERSE LEFT TOTAL SHOULDER ARTHROPLASTY WITH SHORT STEM PRESS FIT DELTA EXTEND PROSTHESIS, BICEPS TENODESIS (Left)  1 Day Post-Op  Reason for Referral: Pain in Left Shoulder (M25.512)  Stiffness of Left Shoulder, Not elsewhere classified (M25.612)  Other abnormalities of gait and mobility (R26.89)  Observation: Payor: BC[employer] / [place] / Product Type: *No Product type* /     MOVEMENT PRECAUTIONS   TWICE A DAY   Active and passive range of motion left elbow hand   Active assisted and passive range of motion left Shoulder to tolerance   Pulleys and pendulums   Do not push motion   nwb left shoulder   wbat bilateral lower extremity   Sling left shoulder     REHAB RECOMMENDATIONS:   Recommendation to date pending progress:  Setting:  Home Health Therapy    Equipment:     sling     ASSESSMENT:   ASSESSMENT:  Mr. Gene Hooks presents with decreased rom and strength of left UE and decreased functional 0 Vitals        Oxygen    None    RESTRICTIONS/PRECAUTIONS:  Restrictions/Precautions: Weight Bearing, ROM Restrictions, Surgical Protocols   TWICE A DAY   Active and passive range of motion left elbow hand   Active assisted and passive range of motion left Shoulder to tolerance   Pulleys and pendulums   Do not push motion   nwb left shoulder   wbat bilateral lower extremity   Sling left shoulder  Left Upper Extremity Weight Bearing: Non Weight Bearing     Restrictions/Precautions: Weight Bearing, ROM Restrictions, Surgical Protocols        HAND DOMINANCE:  Left []  Right [x]      UPPER EXTREMITY GROSS EVALUATION:   RIGHT UE   Within Functional Limits   Abnormal   Comments   Strength [x] []     Active Range of Motion [x] []     Passive Range of Motion           [] []        LEFT UE Within Functional Limits   Abnormal   Comments   Strength [] []  N/a - limited from surgery   Active Range of Motion [] [] AROM LUE (degrees)  LUE AROM : Exceptions  L Shoulder Flexion (0-180): 20 (aarom)  L Elbow Flexion (0-145): 100 (aarom)  L Elbow Extension (145-0): -10 (aarom)  L Wrist Flexion (0-80): wfl  L Wrist Extension (0-70): wfl   Passive Range of Motion [] []       LOWER EXTREMITY GROSS EVALUATION:     Within Functional Limits   Abnormal   Comments   Strength [] [] Decreased functional   Range of Motion [] [] Decreased functional       COGNITION/  PERCEPTION: Intact Impaired (Comments):   Orientation [x]     Vision [x]     Hearing [x]     Cognition  [x]       MOBILITY: I Mod I S SBA CGA Min Mod Max Total  NT x2 Comments:   Bed Mobility    Rolling [] [] [] [] [] [] [] [] [] [] []    Supine to Sit [] [] [] [x] [] [] [] [] [] [] [] With bed rail   Scooting [] [] [] [x] [] [] [] [] [] [] []    Sit to Supine [] [] [] [] [] [] [] [] [] [] []    Transfers    Sit to Stand [] [] [] [x] [] [] [] [] [] [] []    Bed to Chair [] [] [] [x] [] [] [] [] [] [] []    Stand to Sit [] [] [] [x] [] [] [] [] [] [] []    Stand Pivot [] [] [] [] []

## 2023-09-15 NOTE — PROGRESS NOTES
0725 Assisted patient to the bathroom 2x assist. Urine rowdy. He urinated and returned to bed. VSS. He is alert and oriented and \"feels much better. \"

## 2023-09-15 NOTE — PROGRESS NOTES
Cardiology consult discussed with patient. Patient refuses further cardiology treatment at this time and says he will follow up outpatient. Discussed with Ilda Jaramillo NP. Discharge instructions discussed with patient and spouse  All Ivs removed  Opportunity for questions provided. Prescriptions sent to pharmacy by MD  Outpatient therapy.

## 2023-09-16 NOTE — DISCHARGE SUMMARY
[id number] [facility] SUMMARY    Name:  Nestor Madrigal  MR#:  [id number]  :  1955  ACCOUNT #:  [de-identified]  ADMIT DATE:  2023  DISCHARGE DATE:  09/15/2023    ADMISSION DIAGNOSES:  1.  End-stage glenohumeral osteoarthritis, left shoulder. 2.  Biceps tendinitis, left shoulder. 3.  Atrial fibrillation. DISCHARGE DIAGNOSES:  1.  End-stage glenohumeral osteoarthritis, left shoulder. 2.  Biceps tendinitis, left shoulder. 3.  Atrial fibrillation. Please see H and P, operative summaries, and consult for details. HOSPITAL COURSE:  The patient is a 27-year-old gentleman who was admitted on 2023, underwent an uncomplicated reverse left total shoulder arthroplasty with a Delta Xtend prosthesis, biceps tenodesis. He was eventually started on Dilaudid for postop pain control. It was noted on the first postoperative night to be in atrial fibrillation with rapid ventricular rate. Hospice consult was obtained. He was switched to oxycodone. On postoperative day #1, he was afebrile, vital signs were stable, all labs were within normal limits, and his rate was well controlled. He was discharged home on oxycodone. He will follow up in my office in 2 weeks. Dalila Montenegro MD      [id number]  D:  2023 10:20  T:  2023 15:23  JOB #:  [id number]  CC:   Dalila Montenegro MD

## 2023-09-21 ENCOUNTER — TELEPHONE (OUTPATIENT)
Dept: ORTHOPEDIC SURGERY | Age: 68
End: 2023-09-21

## 2023-09-21 DIAGNOSIS — M19.012 OSTEOARTHRITIS OF LEFT GLENOHUMERAL JOINT: Primary | ICD-10-CM

## 2023-09-21 RX ORDER — OXYCODONE HYDROCHLORIDE 10 MG/1
10 TABLET ORAL EVERY 6 HOURS PRN
Qty: 40 TABLET | Refills: 0 | Status: SHIPPED | OUTPATIENT
Start: 2023-09-21 | End: 2023-10-01

## 2023-09-21 NOTE — TELEPHONE ENCOUNTER
He had surgery last Thursday. They have not heard anything about therapy. Is he supposed to go before his post op appt next week? Please give her a call. He needs a refill on his Oxycodone 10mg, he is not taking Dilaudid (it made him sick in the hosp).   Please call her about the [facility] paperwork

## 2023-09-21 NOTE — TELEPHONE ENCOUNTER
Called and spoke to pt's wife. Pt has been taking Oxy 10 and will need a refill over the weekend. Rx routed to [facility]. I advised her that referral was sent to PT and they have attempted to call the patient to schedule, but he has yet to be scheduled. Provided pt's wife with number for PT to schedule ASAP. Pt will also bring FMLA paperwork to drop off and sign release at post op next week.

## 2023-09-25 ENCOUNTER — HOSPITAL ENCOUNTER (OUTPATIENT)
Dept: PHYSICAL THERAPY | Age: 68
Setting detail: RECURRING SERIES
Discharge: HOME | End: 2023-09-28
Attending: ORTHOPAEDIC SURGERY
Payer: COMMERCIAL

## 2023-09-25 DIAGNOSIS — M25.512 ACUTE PAIN OF LEFT SHOULDER: ICD-10-CM

## 2023-09-25 DIAGNOSIS — M25.612 STIFFNESS OF LEFT SHOULDER, NOT ELSEWHERE CLASSIFIED: Primary | ICD-10-CM

## 2023-09-25 DIAGNOSIS — Z96.612 S/P REVERSE TOTAL SHOULDER ARTHROPLASTY, LEFT: ICD-10-CM

## 2023-09-25 PROCEDURE — 97110 THERAPEUTIC EXERCISES: CPT

## 2023-09-25 PROCEDURE — 97161 PT EVAL LOW COMPLEX 20 MIN: CPT

## 2023-09-25 NOTE — PROGRESS NOTES
Session Pain  PT Visit Info  [facility] Portal  MD Guidelines  Scanned Media  Benefits  MyChart    Future Appointments   Date Time Provider [address]   9/27/2023 10:30 AM MEDICAL ASSISTANT - ZORA HONG   12/5/2023  9:00 AM MD NANCY Nunez   12/12/2023  9:45 AM MD ANANDA Harris AMB

## 2023-09-25 NOTE — THERAPY EVALUATION
sleep apnea), Permanent atrial fibrillation ([address]), and Varicose veins of both lower extremities. Mr. Alicia Curtis  has a past surgical history that includes Vein Surgery; Knee arthroscopy (Left, 1988); and shoulder surgery (Left, 9/14/2023). Social History/Living Environment:   Lives With: Spouse       Prior Level of Function/Work/Activity:   Prior level of function: Independent  Current level of function: limited ability to reach arm overhead, carry, difficulty dressing  Occupation: Full time employment  Type of Occupation: [employer] department at [employer]:   Does the patient/guardian have any barriers to learning?: No barriers  Will there be a co-learner?: No  What is the preferred language of the patient/guardian?: English  Is an  required?: No  How does the patient/guardian prefer to learn new concepts?: Listening; Demonstration; Pictures/Videos       Fall Risk Scale: Olmos Total Score: 15  Olmos Fall Risk: Low (0-24)             OBJECTIVE     Observation/Orthostatic Postural Assessment:  [] This section not tested    General observations   Pt presented with sling donned appropriately to surgical arm. Surgical dressing intact and dry. Extensive bruising noted to posterior L upper arm. Appears to be healing. Advised pt and wife to continue to monitor since he's on Eliquis.         Range of Motion   [] This section not tested    AROM  (shoulder not tested due to post-op protocol)      Elbow, wrist and hand    Good volitional movement and grossly symmetrical     PROM    Shoulder    Flexion: 90 deg  Scaption: 90 deg  ER: 30 deg  IR: (to stomach only per protocol) hand able to reach stomach with shoulder in neutral extension      Muscle Length          Strength  [x] This section not tested due to post-op status     Right Left   Shoulder     Flexion       Abduction       External rotation       Internal rotation       Elbow     Flexion       Extension        Strength              Special

## 2023-09-27 ENCOUNTER — OFFICE VISIT (OUTPATIENT)
Dept: ORTHOPEDIC SURGERY | Age: 68
End: 2023-09-27

## 2023-09-27 DIAGNOSIS — Z96.612 AFTERCARE FOLLOWING LEFT SHOULDER JOINT REPLACEMENT SURGERY: ICD-10-CM

## 2023-09-27 DIAGNOSIS — Z47.1 AFTERCARE FOLLOWING LEFT SHOULDER JOINT REPLACEMENT SURGERY: ICD-10-CM

## 2023-09-27 DIAGNOSIS — Z96.612 PRESENCE OF LEFT ARTIFICIAL SHOULDER JOINT: Primary | ICD-10-CM

## 2023-09-27 PROCEDURE — 99024 POSTOP FOLLOW-UP VISIT: CPT | Performed by: ORTHOPAEDIC SURGERY

## 2023-09-27 NOTE — PROGRESS NOTES
Today: 23    Name: Ej Pleitez  : 1955  MRN: [id number]    Patient comes in today for their post op appointment. He is 13 days status post Reverse left total shoulder arthroplasty with a short stem press-fit Delta Xtend prosthesis, biceps tenodesis. X-rays will be reviewed and dictated by Dr. Michel Min. Patient was only seen by Tien Barnett CST to maintain post operative protocol, all orders for this visit were received verbally by Dr. Michel Min prior to appointment.

## 2023-09-27 NOTE — PROGRESS NOTES
Progress Notes by Mat Rowley MD at 01/04/22 3128                    Author: Mat Rowley MD  Service: --  Author Type: Physician       Filed: 01/04/22 1214  Encounter Date: 1/4/2022  Status: Signed          : Mat Rowley MD (Physician)                            Name: Ike Casas   YOB: 1955   Gender: male   MRN: [id number]              HPI: Ike Casas is a 78 y.o. right-hand-dominant gentleman 2 weeks status post reverse left total shoulder arthroplasty with a short stem press-fit delta xtend prosthesis biceps tenodesis. He returns and notes that overall he is doing well but he is bruised     ROS/Meds/PSH/PMH/FH/SH: A ten system review of systems was performed and is negative other than what is in the HPI. Tobacco:  reports that he has never smoked. He has never used smokeless tobacco.   There were no vitals taken for this visit. Physical Examination:   He is an awake alert pleasant gentleman ambulate with an antalgic gait on the left side. The right knee has a range of motion of 0 to 130 degrees   Global right knee pain   negative Lachman,   negative anterior drawer,    negative posterior drawer   negative pivot. Good tibial step-off,    No varus or valgus laxity at 0 or 30 degrees. Negative lateral joint line tenderness    negative lateral Dejuan. Negative medial joint line tenderness   negative medial Dejuan. No evidence of any posterolateral instability. No patellofemoral pain. Negative compression,    negative apprehension   no effusion. Calves Are non-tender,   neurovascularly patient is intact. Negative Homans. MPFL is non-tender. Patient Can fully extend the knee. Good quad tone   No erythema. Negative Dial test.         Left knee has a medial parapatellar incision that is healed.    The left knee has a range of motion of 0 to 130 degrees   Trace Lachman,   Trace anterior drawer,    negative

## 2023-10-04 ENCOUNTER — HOSPITAL ENCOUNTER (OUTPATIENT)
Dept: PHYSICAL THERAPY | Age: 68
Setting detail: RECURRING SERIES
Discharge: HOME | End: 2023-10-07
Attending: ORTHOPAEDIC SURGERY
Payer: COMMERCIAL

## 2023-10-04 ENCOUNTER — OFFICE VISIT (OUTPATIENT)
Dept: ORTHOPEDIC SURGERY | Age: 68
End: 2023-10-04

## 2023-10-04 DIAGNOSIS — Z47.1 AFTERCARE FOLLOWING LEFT SHOULDER JOINT REPLACEMENT SURGERY: ICD-10-CM

## 2023-10-04 DIAGNOSIS — Z96.612 PRESENCE OF LEFT ARTIFICIAL SHOULDER JOINT: Primary | ICD-10-CM

## 2023-10-04 DIAGNOSIS — Z96.612 AFTERCARE FOLLOWING LEFT SHOULDER JOINT REPLACEMENT SURGERY: ICD-10-CM

## 2023-10-04 PROCEDURE — 97110 THERAPEUTIC EXERCISES: CPT

## 2023-10-04 PROCEDURE — 99024 POSTOP FOLLOW-UP VISIT: CPT | Performed by: ORTHOPAEDIC SURGERY

## 2023-10-04 NOTE — PROGRESS NOTES
LEFT (MIN 2 VIEWS)      2. Aftercare following left shoulder joint replacement surgery  Z47.1 XR SHOULDER LEFT (MIN 2 VIEWS)    N21.421          Report: AP Y axillary views left shoulder demonstrate a short stem press-fit reverse left total shoulder arthroplasty in excellent position    Impression: Status post short stem press-fit reverse left total shoulder arthroplasty   José Winn MD      MRI left shoulder demonstrates a type I acromion. Degenerative changes in the [facility] joint. Marked degenerative changes in the glenohumeral joint with significant glenoid wear and a Fajardo A2 glenoid minimal posterior subluxation. There is evidence of biceps tendinopathy. Minor procedure:                   Impression:      1. Presence of left artificial shoulder joint    2. Aftercare following left shoulder joint replacement surgery        Status post short stem press-fit reverse left total shoulder arthroplasty with a delta xtend prosthesis biceps tenodesis 3 weeks  Postoperative hematoma on Eliquis improving  Status post Durolane viscosupplementation into both knees 6/27/2023, 1/24/2023, 7/25/2022    Status post previous left knee operative procedure. Glenohumeral osteoarthritis left shoulder    Biceps tendinitis left shoulder    AC joint arthritis left shoulder    Atrial fibrillation on Eliquis    Hypertension    Gout      Plan:    I discussed the plan with the patient. We removed his suture. We will transition him to outpatient physical therapy. I will recheck him back in 3 weeks with new AP, Y and axillary views left shoulder         Follow up:         Follow-up and Dispositions       Return in about 6 months                           José Townsend MD

## 2023-10-04 NOTE — PROGRESS NOTES
Janet Lopez  : 1955  Primary: Elsy Porter Sc ([facility])  Secondary:  [address] [facility] [address]  Phone: [phone]  Fax: [phone] Plan Frequency: 1-2x/week, 12 weeks  Plan of Care/Certification Expiration Date: 23      >PT Visit Info:  Plan Frequency: 1-2x/week, 12 weeks  Plan of Care/Certification Expiration Date: 23      Visit Count:  2    OUTPATIENT PHYSICAL THERAPY:OP NOTE TYPE: OP Note Type: Treatment Note 10/4/2023       Episode  }Appt Desk             Treatment Diagnosis:        Medical/Referring Diagnosis:  No admission diagnoses are documented for this encounter. Referring Physician:  George Guidry MD MD Orders:  PT Eval and Treat   Date of Onset:  Onset Date: 23 (DOS)     Allergies:   Patient has no known allergies. Restrictions/Precautions:  Restrictions/Precautions: Surgical protocol  Left Upper Extremity Weight Bearing: Non Weight Bearing     Interventions Planned (Treatment may consist of any combination of the following):    Current Treatment Recommendations: Strengthening; ROM; Manual; Neuromuscular re-education; Pain management; Return to work related activity; Home exercise program; Modalities; Therapeutic activities     >Subjective Comments: Doing well. MD visit went well. He's right on track for now.      >Initial:     0 /10>Post Session:       0 /10  Medications Last Reviewed:  10/4/2023  Updated Objective Findings:  115 deg flexion with pulleys    6 weeks: 10/26/23 - begin AROM and deltoid isotonics, IR and ER isometrics  12 weeks: 23 - begin moderate resistance exercises  Treatment     THERAPEUTIC EXERCISE: (40 minutes):    Exercises per grid below to improve mobility, strength, balance, and coordination. Progressed resistance and repetitions as indicated.      Date:  2023 Date:  10/4/23 Date:     Activity/Exercise Parameters Parameters Parameters   Education Diagnosis,

## 2023-10-09 ENCOUNTER — TELEPHONE (OUTPATIENT)
Dept: ORTHOPEDIC SURGERY | Age: 68
End: 2023-10-09

## 2023-10-09 NOTE — TELEPHONE ENCOUNTER
Called and notified pt that [facility] paperwork is completed and available to be picked up at the . Pt acknowledges and reports he will pick them up soon.

## 2023-10-11 ENCOUNTER — HOSPITAL ENCOUNTER (OUTPATIENT)
Dept: PHYSICAL THERAPY | Age: 68
Setting detail: RECURRING SERIES
Discharge: HOME | End: 2023-10-14
Attending: ORTHOPAEDIC SURGERY
Payer: COMMERCIAL

## 2023-10-11 PROCEDURE — 97110 THERAPEUTIC EXERCISES: CPT

## 2023-10-11 NOTE — PROGRESS NOTES
Activity/Exercise Parameters Parameters Parameters   Education Diagnosis, prognosis, POC, HEP, anatomy/physiology of condition, reinforced no behind the back reaching for ~12 weeks, P/AAROM only for 6 weeks       Elbow flexion and extension x10     Wrist flexion and extension x10     Flexion PROM walkouts 10x10 sec 3 min 3 min   ER PROM turnouts 10x10 sec 3 min 3 min   ER @45 deg   Scapular plane to target, x20, 3 lbs   4 way shoulder iso 5x10 sec     UBE  (AAROM) 4/4 min fwd/bwd (AAROM) 4/4 min fwd/bwd   Ring stretch  Next session    Swiffer  Seated flexion 1 min    Standing flexion 1 min    Supine cane  X15 punch x30 OH x30 punch to [facility] Abiodun   Ball rolls  Next session    pulleys  3 min flex, 3 min scaption 3 min flex       THERAPEUTIC ACTIVITY: ( 0 minutes): Therapeutic activities per grid below to improve mobility, strength, coordination, and dynamic movement to improve functional lifting, carrying, reaching, catching, and overhead activities. Date:  10/11/2023 Date:   Date:     Activity/Exercise Parameters Parameters Parameters                                                 MANUAL THERAPY: (0 minutes):   Joint mobilization, Soft tissue mobilization, and Manipulation was utilized and necessary because of the patient's restricted joint motion, painful spasm, loss of articular motion, and restricted motion of soft tissue.               Date  10/11/2023      Technique Used Grade Level # Time(s) Effect while being performed                                                                                         HEP Log Date    see 9/25/23    2. + supine cane flexion, pulleys (flexion and scaption) 10/4/23   3.    4.     5.        POC    Recertification Expiration Date      Plan of Care/Certification Expiration Date: 12/29/23     Visit Count  3    Number of Allowed Visits            Treatment/Session Summary:    >Treatment Assessment: Overall, pt is progressing very well with P/AAROM and pain appears to be well

## 2023-10-25 ENCOUNTER — OFFICE VISIT (OUTPATIENT)
Dept: ORTHOPEDIC SURGERY | Age: 68
End: 2023-10-25

## 2023-10-25 ENCOUNTER — HOSPITAL ENCOUNTER (OUTPATIENT)
Dept: PHYSICAL THERAPY | Age: 68
Setting detail: RECURRING SERIES
Discharge: HOME | End: 2023-10-28
Attending: ORTHOPAEDIC SURGERY
Payer: COMMERCIAL

## 2023-10-25 DIAGNOSIS — Z96.612 PRESENCE OF LEFT ARTIFICIAL SHOULDER JOINT: Primary | ICD-10-CM

## 2023-10-25 DIAGNOSIS — Z96.612 AFTERCARE FOLLOWING LEFT SHOULDER JOINT REPLACEMENT SURGERY: ICD-10-CM

## 2023-10-25 DIAGNOSIS — Z47.1 AFTERCARE FOLLOWING LEFT SHOULDER JOINT REPLACEMENT SURGERY: ICD-10-CM

## 2023-10-25 PROCEDURE — 97110 THERAPEUTIC EXERCISES: CPT

## 2023-10-25 RX ORDER — OXYCODONE HYDROCHLORIDE 10 MG/1
10 TABLET ORAL EVERY 6 HOURS PRN
Qty: 40 TABLET | Refills: 0 | Status: SHIPPED | OUTPATIENT
Start: 2023-10-25 | End: 2023-11-04

## 2023-10-25 NOTE — PROGRESS NOTES
pivot.    Good tibial step-off,    No varus or valgus laxity at 0 or 30 degrees. Negative lateral joint line tenderness    negative lateral Dejuan. Marked medial joint line tenderness   negative medial Dejuan. No evidence of any posterolateral instability. Positive patellofemoral pain. Negative compression,    negative apprehension   Trace effusion   Calves Are non-tender,   neurovascularly patient is intact. Negative Homans. MPFL is non-tender. Patient Can fully extend the knee. Good quad tone   No erythema. Negative Dial test.    The right shoulder has 0 to 180 degrees of active and 0 to 180 degrees passive forward elevation. Internal rotation is to T6. External rotation is to 60 degrees at the side. In the 90 degree abducted position 90 degrees of external and 90 degrees internal rotation  The AC joint is non-tender  SC joint is non-tender. Greater tuberosity is non-tender. negative biceps  Negative O'Briens sign  negative lift-off sign  Negative belly press sign  Negative bear huggers sign  negative drop sign  negative hornblower's sign  No posterior glenohumeral joint line tenderness. No evident excessive external rotation  Rotator cuff strength is 5/5.  negative external rotation stress test.   Negative empty can sign  There is no evident anterior or posterior apprehension with a negative sulcus sign. No instability  negative external and internal Rotation lag sign  Neurovascularly intact. The left shoulder deltopectoral incision has healed  Active forward elevation is 0-120  Passively goes 0-160  ER to 40  IR to L1  Biceps has good cosmetic appearance  He is neurovascular intact                     Data Reviewed:      XR: AP Y axillary views left shoulder   Clinical Indication    ICD-10-CM    1. Presence of left artificial shoulder joint  Z96.612 XR SHOULDER LEFT (MIN 2 VIEWS)      2.  Aftercare following left shoulder joint replacement surgery  Z47.1 XR

## 2023-10-25 NOTE — PROGRESS NOTES
Valeriy Ana  : 1955  Primary: Jeannette Youngsville Sc ([facility])  Secondary:  [address] @ [address] [facility] [address]  Phone: [phone]  Fax: [phone] Plan Frequency: 1-2x/week, 12 weeks  Plan of Care/Certification Expiration Date: 23      >PT Visit Info:  Plan Frequency: 1-2x/week, 12 weeks  Plan of Care/Certification Expiration Date: 23      Visit Count:  4    OUTPATIENT PHYSICAL THERAPY:OP NOTE TYPE: OP Note Type: Treatment Note 10/25/2023       Episode  }Appt Desk             Treatment Diagnosis:        Medical/Referring Diagnosis:  No admission diagnoses are documented for this encounter. Referring Physician:  Ольга Mcguire MD MD Orders:  PT Eval and Treat   Date of Onset:  Onset Date: 23 (DOS)     Allergies:   Patient has no known allergies. Restrictions/Precautions:  Restrictions/Precautions: Surgical protocol  Left Upper Extremity Weight Bearing: Non Weight Bearing     Interventions Planned (Treatment may consist of any combination of the following):    Current Treatment Recommendations: Strengthening; ROM; Manual; Neuromuscular re-education; Pain management; Return to work related activity; Home exercise program; Modalities; Therapeutic activities     >Subjective Comments: Not having any pain in the shoulder. Reports compliance with HEP. Pulleys are the most challenging and he does them every other day. >Initial:     0 /10>Post Session:       0 /10  Medications Last Reviewed:  10/25/2023  Updated Objective Findings: 130 deg flexion with pulleys    6 weeks: 10/26/23 - begin AROM and deltoid isotonics, IR and ER isometrics  12 weeks: 23 - begin moderate resistance exercises  Treatment     THERAPEUTIC EXERCISE: (40 minutes):    Exercises per grid below to improve mobility, strength, balance, and coordination. Progressed resistance and repetitions as indicated.      Date:  2023 Date:  10/4/23 Date:  10/11/23

## 2023-10-27 ENCOUNTER — TELEPHONE (OUTPATIENT)
Dept: ORTHOPEDIC SURGERY | Age: 68
End: 2023-10-27

## 2023-10-27 NOTE — TELEPHONE ENCOUNTER
Spoke with pt who requests bilateral visco and last received injections on 6/27. Pt verbalized understanding of injection timeline and  reports he wants to be proactive and get on schedule early. Will speak with LBF and AGP in regards to timeline and when pt can be scheduled and call pt back on Mon 10/30; pt verbalizes understanding.

## 2023-10-31 ENCOUNTER — HOSPITAL ENCOUNTER (OUTPATIENT)
Dept: PHYSICAL THERAPY | Age: 68
Setting detail: RECURRING SERIES
Discharge: HOME | End: 2023-11-03
Attending: ORTHOPAEDIC SURGERY
Payer: COMMERCIAL

## 2023-10-31 PROCEDURE — 97110 THERAPEUTIC EXERCISES: CPT

## 2023-10-31 NOTE — PROGRESS NOTES
Janet Lopez  : 1955  Primary: Elsy Porter Sc ([facility])  Secondary:  [address] @ [address] [facility] [address]  Phone: [phone]  Fax: [phone] Plan Frequency: 1-2x/week, 12 weeks  Plan of Care/Certification Expiration Date: 23      >PT Visit Info:  Plan Frequency: 1-2x/week, 12 weeks  Plan of Care/Certification Expiration Date: 23      Visit Count:  5    OUTPATIENT PHYSICAL THERAPY:OP NOTE TYPE: OP Note Type: Treatment Note 10/31/2023       Episode  }Appt Desk             Treatment Diagnosis:        Medical/Referring Diagnosis:  No admission diagnoses are documented for this encounter. Referring Physician:  George Guidry MD MD Orders:  PT Eval and Treat   Date of Onset:  Onset Date: 23 (DOS)     Allergies:   Patient has no known allergies. Restrictions/Precautions:  Restrictions/Precautions: Surgical protocol  Left Upper Extremity Weight Bearing: Non Weight Bearing     Interventions Planned (Treatment may consist of any combination of the following):    Current Treatment Recommendations: Strengthening; ROM; Manual; Neuromuscular re-education; Pain management; Return to work related activity; Home exercise program; Modalities; Therapeutic activities     >Subjective Comments: Shoulder's doing really well. Not really having any issues with his daily activities. >Initial:     0 /10>Post Session:       0 /10  Medications Last Reviewed:  10/31/2023  Updated Objective Findings: 140 deg flexion AAROM with rings    6 weeks: 10/26/23 - begin AROM and deltoid isotonics, IR and ER isometrics  12 weeks: 23 - begin moderate resistance exercises  Treatment     THERAPEUTIC EXERCISE: (45 minutes):    Exercises per grid below to improve mobility, strength, balance, and coordination. Progressed resistance and repetitions as indicated.      Date:  2023 Date:  10/4/23 Date:  10/11/23 Date  10/25/23 Date  10/31/23

## 2023-11-02 ENCOUNTER — HOSPITAL ENCOUNTER (OUTPATIENT)
Dept: PHYSICAL THERAPY | Age: 68
Setting detail: RECURRING SERIES
Discharge: HOME | End: 2023-11-05
Attending: ORTHOPAEDIC SURGERY
Payer: COMMERCIAL

## 2023-11-02 PROCEDURE — 97110 THERAPEUTIC EXERCISES: CPT

## 2023-11-02 NOTE — PROGRESS NOTES
Bob Dubois  : 1955  Primary: Darion Zavala Sc ([facility])  Secondary:  [address] @ [address] [facility] [address]  Phone: [phone]  Fax: [phone] Plan Frequency: 1-2x/week, 12 weeks  Plan of Care/Certification Expiration Date: 23      >PT Visit Info:  Plan Frequency: 1-2x/week, 12 weeks  Plan of Care/Certification Expiration Date: 23      Visit Count:  6    OUTPATIENT PHYSICAL THERAPY:OP NOTE TYPE: OP Note Type: Treatment Note 2023       Episode  }Appt Desk             Treatment Diagnosis:        Medical/Referring Diagnosis:  No admission diagnoses are documented for this encounter. Referring Physician:  Candido Cotton MD MD Orders:  PT Eval and Treat   Date of Onset:  Onset Date: 23 (DOS)     Allergies:   Patient has no known allergies. Restrictions/Precautions:  Restrictions/Precautions: Surgical protocol  Left Upper Extremity Weight Bearing: Non Weight Bearing     Interventions Planned (Treatment may consist of any combination of the following):    Current Treatment Recommendations: Strengthening; ROM; Manual; Neuromuscular re-education; Pain management; Return to work related activity; Home exercise program; Modalities; Therapeutic activities     >Subjective Comments: Shoulder doing well- no c/o's pain. >Initial:     0 /10>Post Session:       0 /10  Medications Last Reviewed:  2023  Updated Objective Findings: 140 deg flexion AAROM with rings    6 weeks: 10/26/23 - begin AROM and deltoid isotonics, IR and ER isometrics  12 weeks: 23 - begin moderate resistance exercises  Treatment     THERAPEUTIC EXERCISE: (45 minutes):    Exercises per grid below to improve mobility, strength, balance, and coordination. Progressed resistance and repetitions as indicated.      Date:  2023 Date:  10/4/23 Date:  10/11/23 Date  10/25/23 Date  10/31/23 11/2/23   Activity/Exercise Parameters Parameters Parameters

## 2023-11-06 ENCOUNTER — HOSPITAL ENCOUNTER (OUTPATIENT)
Dept: PHYSICAL THERAPY | Age: 68
Setting detail: RECURRING SERIES
Discharge: HOME | End: 2023-11-09
Attending: ORTHOPAEDIC SURGERY
Payer: COMMERCIAL

## 2023-11-06 PROCEDURE — 97110 THERAPEUTIC EXERCISES: CPT

## 2023-11-06 NOTE — PROGRESS NOTES
10:30 AM Elisa Velazco, PT [facility] AND [facility]   11/22/2023 10:15 AM Evin Mathias, PT [facility]   12/5/2023  9:00 AM MD NANCY Gustafson [facility]   12/5/2023  9:45 AM PostSamreen hyman MD [facility]   12/12/2023  9:45 AM PostSamreen hyman MD [facility]

## 2023-11-09 ENCOUNTER — HOSPITAL ENCOUNTER (OUTPATIENT)
Dept: PHYSICAL THERAPY | Age: 68
Setting detail: RECURRING SERIES
Discharge: HOME | End: 2023-11-12
Attending: ORTHOPAEDIC SURGERY
Payer: COMMERCIAL

## 2023-11-09 PROCEDURE — 97110 THERAPEUTIC EXERCISES: CPT

## 2023-11-09 NOTE — PROGRESS NOTES
Osiel Flor  : 1955  Primary: Kathy Delacruz ([facility])  Secondary:  [address] [facility] [address]  Phone: [phone]  Fax: [phone] Plan Frequency: 1-2x/week, 12 weeks  Plan of Care/Certification Expiration Date: 23      >PT Visit Info:  Plan Frequency: 1-2x/week, 12 weeks  Plan of Care/Certification Expiration Date: 23      Visit Count:  8    OUTPATIENT PHYSICAL THERAPY:OP NOTE TYPE: OP Note Type: Treatment Note 2023       Episode  }Appt Desk             Treatment Diagnosis:             Codes     Stiffness of left shoulder, not elsewhere classified    -  Primary M25.612     Acute pain of left shoulder     M25.512     S/P reverse total shoulder arthroplasty, left     R05.668     Medical/Referring Diagnosis:Aftercare following left shoulder joint replacement surgery [Z47.1, Z96.612]   Referring Physician:  Jared Islas MD MD Orders:  PT Eval and Treat   Date of Onset:  Onset Date: 23 (DOS)     Allergies:   Patient has no known allergies. Restrictions/Precautions:  Restrictions/Precautions: Surgical protocol  Left Upper Extremity Weight Bearing: Non Weight Bearing     Interventions Planned (Treatment may consist of any combination of the following):    Current Treatment Recommendations: Strengthening; ROM; Manual; Neuromuscular re-education; Pain management; Return to work related activity; Home exercise program; Modalities; Therapeutic activities     >Subjective Comments: Doing really well. Pain is gone now. Able to reach for light objects. Really encouraged right now. Going to work on Monday. >Initial:     0 /10>Post Session:       0 /10  Medications Last Reviewed:  2023  Updated Objective Findings: Flexion AROM: R 143 deg, L 133 deg.  Single arm carry 10 lbs, B OH press 6 lbs, 2 hand box carry 15 lbs     6 weeks: 10/26/23 - begin AROM and deltoid isotonics, IR and ER isometrics  12 weeks: 23 -

## 2023-11-14 ENCOUNTER — HOSPITAL ENCOUNTER (OUTPATIENT)
Dept: PHYSICAL THERAPY | Age: 68
Setting detail: RECURRING SERIES
Discharge: HOME | End: 2023-11-17
Attending: ORTHOPAEDIC SURGERY
Payer: COMMERCIAL

## 2023-11-14 PROCEDURE — 97110 THERAPEUTIC EXERCISES: CPT

## 2023-11-14 NOTE — PROGRESS NOTES
shoulder ROM per protocol.     >Total Treatment Billable Duration:  40 minutes  Time In: 1015  Time Out: 1 [facility], PT       Charge Capture  }Post Session Pain  PT Visit Info  [facility] Portal  MD Guidelines  Scanned Media  Benefits  MyChart    Future Appointments   Date Time Provider [address]   11/16/2023 10:30 AM [facility] R[facility]   11/28/2023 11:00 AM [facility]   11/30/2023  9:45 AM Juana Rocha PT [facility]   12/5/2023  9:00 AM MD NANCY Dexter AMB   12/5/2023  9:45 AM Eunice Muhammad MD [facility]   12/12/2023  9:45 AM Eunice Muhammad MD [facility]

## 2023-11-16 ENCOUNTER — HOSPITAL ENCOUNTER (OUTPATIENT)
Dept: PHYSICAL THERAPY | Age: 68
Setting detail: RECURRING SERIES
Discharge: HOME | End: 2023-11-19
Attending: ORTHOPAEDIC SURGERY
Payer: COMMERCIAL

## 2023-11-16 PROCEDURE — 97110 THERAPEUTIC EXERCISES: CPT

## 2023-11-16 NOTE — PROGRESS NOTES
Leland Factor  : 1955  Primary: Nancielore Delacruz ([facility])  Secondary:  [address] [facility] [address]  Phone: [phone]  Fax: [phone] Plan Frequency: 1-2x/week, 12 weeks  Plan of Care/Certification Expiration Date: 23      >PT Visit Info:  Plan Frequency: 1-2x/week, 12 weeks  Plan of Care/Certification Expiration Date: 23      Visit Count:  10    OUTPATIENT PHYSICAL THERAPY:OP NOTE TYPE: OP Note Type: Treatment Note 2023       Episode  }Appt Desk             Treatment Diagnosis:             Codes     Stiffness of left shoulder, not elsewhere classified    -  Primary M25.612     Acute pain of left shoulder     M25.512     S/P reverse total shoulder arthroplasty, left     K02.383     Medical/Referring Diagnosis:Aftercare following left shoulder joint replacement surgery [Z47.1, Z96.612]   Referring Physician:  Shannon Frederick MD MD Orders:  PT Eval and Treat   Date of Onset:  Onset Date: 23 (DOS)     Allergies:   Patient has no known allergies. Restrictions/Precautions:  Restrictions/Precautions: Surgical protocol  Left Upper Extremity Weight Bearing: Non Weight Bearing     Interventions Planned (Treatment may consist of any combination of the following):    Current Treatment Recommendations: Strengthening; ROM; Manual; Neuromuscular re-education; Pain management; Return to work related activity; Home exercise program; Modalities; Therapeutic activities     >Subjective Comments: Felt great at work yesterday. No pain. Still moderating his weight at work. >Initial:     0 /10>Post Session:       0 /10  Medications Last Reviewed:  2023  Updated Objective Findings: Flexion AROM: R 143 deg, L 133 deg.  Single arm carry 10 lbs, B OH press 6 lbs, 2 hand box carry 15 lbs     6 weeks: 10/26/23 - begin AROM and deltoid isotonics, IR and ER isometrics  12 weeks: 23 - begin moderate resistance exercises  Treatment Detail Level: Generalized Patient Specific Counseling (Will Not Stick From Patient To Patient): Patient to check his hiking boots to make sure he is not traumatizing his nails as well. Patient Specific Counseling (Will Not Stick From Patient To Patient): Recommended a field treatment for him discussed pdt vs 5fu. Patient would like to do pdt, counseled the patient that if he decides not to get the pdt please come back for alternate treatment Detail Level: Zone

## 2023-11-20 ENCOUNTER — APPOINTMENT (OUTPATIENT)
Dept: PHYSICAL THERAPY | Age: 68
End: 2023-11-20
Attending: ORTHOPAEDIC SURGERY
Payer: COMMERCIAL

## 2023-11-22 ENCOUNTER — APPOINTMENT (OUTPATIENT)
Dept: PHYSICAL THERAPY | Age: 68
End: 2023-11-22
Attending: ORTHOPAEDIC SURGERY
Payer: COMMERCIAL

## 2023-11-28 ENCOUNTER — HOSPITAL ENCOUNTER (OUTPATIENT)
Dept: PHYSICAL THERAPY | Age: 68
Setting detail: RECURRING SERIES
Discharge: HOME | End: 2023-12-01
Attending: ORTHOPAEDIC SURGERY
Payer: COMMERCIAL

## 2023-11-28 PROCEDURE — 97110 THERAPEUTIC EXERCISES: CPT

## 2023-11-28 NOTE — PROGRESS NOTES
Kathryn Faizan  : 1955  Primary: Juan Miguel Pham Sc ([facility])  Secondary:  [address] [facility] [address]  Phone: [phone]  Fax: [phone] Plan Frequency: 1-2x/week, 12 weeks  Plan of Care/Certification Expiration Date: 23      >PT Visit Info:  Plan Frequency: 1-2x/week, 12 weeks  Plan of Care/Certification Expiration Date: 23      Visit Count:  11    OUTPATIENT PHYSICAL THERAPY:OP NOTE TYPE: OP Note Type: Treatment Note 2023       Episode  }Appt Desk             Treatment Diagnosis:             Codes     Stiffness of left shoulder, not elsewhere classified    -  Primary M25.612     Acute pain of left shoulder     M25.512     S/P reverse total shoulder arthroplasty, left     Z58.261     Medical/Referring Diagnosis:Aftercare following left shoulder joint replacement surgery [Z47.1, Z96.612]   Referring Physician:  Kirstie [place], MD [facility] Orders:  PT Eval and Treat   Date of Onset:  Onset Date: 23 (DOS)     Allergies:   Patient has no known allergies. Restrictions/Precautions:  Restrictions/Precautions: Surgical protocol  Left Upper Extremity Weight Bearing: Non Weight Bearing     Interventions Planned (Treatment may consist of any combination of the following):    Current Treatment Recommendations: Strengthening; ROM; Manual; Neuromuscular re-education; Pain management; Return to work related activity; Home exercise program; Modalities; Therapeutic activities     >Subjective Comments: Feeling tired lately. Knows that his arms need to get stronger. >Initial:     0 /10>Post Session:       0 /10  Medications Last Reviewed:  2023  Updated Objective Findings: Flexion AROM: R 143 deg, L 133 deg.  Single arm carry 10 lbs, B OH press 6 lbs, 2 hand box carry 15 lbs     6 weeks: 10/26/23 - begin AROM and deltoid isotonics, IR and ER isometrics  12 weeks: 23 - begin moderate resistance exercises  Treatment

## 2023-11-30 ENCOUNTER — HOSPITAL ENCOUNTER (OUTPATIENT)
Dept: PHYSICAL THERAPY | Age: 68
Setting detail: RECURRING SERIES
End: 2023-11-30
Attending: ORTHOPAEDIC SURGERY
Payer: COMMERCIAL

## 2023-11-30 PROCEDURE — 97530 THERAPEUTIC ACTIVITIES: CPT

## 2023-11-30 NOTE — PROGRESS NOTES
Anna Elaina  : 1955  [facility]: Daniel Norwood Sc ([facility]  Secondary:  [address] [facility] [address]  Phone: [phone]  Fax: [phone] Plan Frequency: 1-2x/week, 12 weeks    Plan of Care/Certification Expiration Date: 23      PT Visit Info:  Plan Frequency: 1-2x/week, 12 weeks  Plan of Care/Certification Expiration Date: 23      Visit Count:  12                OUTPATIENT PHYSICAL THERAPY:             Progress Report 2023               Episode (RTSA) Appt Desk         Treatment Diagnosis:        Medical/Referring Diagnosis:  No admission diagnoses are documented for this encounter. Referring Physician:  Madisyn Blanton MD MD Orders:  PT Eval and Treat  Return MD Appt:    Date of Onset:  Onset Date: 23 (DOS)      Allergies:  Patient has no known allergies. Restrictions/Precautions:    Restrictions/Precautions: Surgical protocol        Medications Last Reviewed:  2023     SUBJECTIVE   History of Injury/Illness (Reason for Referral): Pt presents s/p L reverse total shoulder arthroplasty performed on 23 by Dr. Misa Santiago for treatment of functional deficits and pain from shoulder OA. Since surgery, he has reported minimal pain. Pt would like to get back to daily activities and work ASAP, hoping for back to work by 8-10 weeks post-op. Progress Report 23: Feels like he's getting stronger. Really pleased with his shoulder so far. Feels like his mobility is good it's just that he's lacking strength overhead, which is making it harder to lift items at work.      Patient Stated Goal(s):  \"mobility, strength\"  Initial:      0/10 Post Session:     0 /10  Past Medical History/Comorbidities:   Mr. Pratibha Lentz  has a past medical history of CAD (coronary artery disease), Chronic anticoagulation, Dilated cardiomyopathy ([address]), Essential hypertension, History of COVID-19, Marijuana use, DONITA (obstructive sleep apnea), will be carried out by a therapist or under their direction.   Thank you for this referral,  Debby Dior, PT     Referring Physician Signature: Kirstie Zamudio., MD         Post Session Pain  Charge Capture  PT Visit Info MD Guidelines  Paulettehart

## 2023-11-30 NOTE — PROGRESS NOTES
Brenden Linn  : 1955  Primary: Venus Robert Sc ([facility])  Secondary:  [address] @ [address] [facility] [address]  Phone: [phone]  Fax: [phone] Plan Frequency: 1-2x/week, 12 weeks  Plan of Care/Certification Expiration Date: 23      >PT Visit Info:  Plan Frequency: 1-2x/week, 12 weeks  Plan of Care/Certification Expiration Date: 23      Visit Count:  12    OUTPATIENT PHYSICAL THERAPY:OP NOTE TYPE: OP Note Type: Treatment Note 2023       Episode  }Appt Desk             Treatment Diagnosis:             Codes     Stiffness of left shoulder, not elsewhere classified    -  Primary M25.612     Acute pain of left shoulder     M25.512     S/P reverse total shoulder arthroplasty, left     H55.193     Medical/Referring Diagnosis:Aftercare following left shoulder joint replacement surgery [Z47.1, Z96.612]   Referring Physician:  Tip Houston MD MD Orders:  PT Eval and Treat   Date of Onset:  Onset Date: 23 (DOS)     Allergies:   Patient has no known allergies. Restrictions/Precautions:  Restrictions/Precautions: Surgical protocol  Left Upper Extremity Weight Bearing: Non Weight Bearing     Interventions Planned (Treatment may consist of any combination of the following):    Current Treatment Recommendations: Strengthening; ROM; Manual; Neuromuscular re-education; Pain management; Return to work related activity; Home exercise program; Modalities; Therapeutic activities     >Subjective Comments: Doing well. Feels like he's making progress. >Initial:     0 /10>Post Session:       0 /10  Medications Last Reviewed:  2023  Updated Objective Findings: Flexion AROM: R 143 deg, L 133 deg.  Single arm carry 10 lbs, B OH press 6 lbs, 2 hand box carry 15 lbs     6 weeks: 10/26/23 - begin AROM and deltoid isotonics, IR and ER isometrics  12 weeks: 23 - begin moderate resistance exercises  Treatment     THERAPEUTIC EXERCISE: (41

## 2023-12-05 ENCOUNTER — OFFICE VISIT (OUTPATIENT)
Dept: ORTHOPEDIC SURGERY | Age: 68
End: 2023-12-05

## 2023-12-05 DIAGNOSIS — Z96.612 AFTERCARE FOLLOWING LEFT SHOULDER JOINT REPLACEMENT SURGERY: ICD-10-CM

## 2023-12-05 DIAGNOSIS — Z96.612 PRESENCE OF LEFT ARTIFICIAL SHOULDER JOINT: Primary | ICD-10-CM

## 2023-12-05 DIAGNOSIS — Z47.1 AFTERCARE FOLLOWING LEFT SHOULDER JOINT REPLACEMENT SURGERY: ICD-10-CM

## 2023-12-05 NOTE — PROGRESS NOTES
Progress Notes by Candido Cotton MD at 01/04/22 4527                    Author: Candido Cotton MD  Service: --  Author Type: Physician       Filed: 01/04/22 1214  Encounter Date: 1/4/2022  Status: Signed          : Candido Cotton MD (Physician)                            Name: Bob Dubois   YOB: 1955   Gender: male   MRN: [id number]              HPI: Bob Dubois is a 78 y.o. right-hand-dominant gentleman almost 3 months status post reverse left total shoulder arthroplasty with a short stem press-fit delta xtend prosthesis biceps tenodesis. He returns and notes that he is doing fantastic     ROS/Meds/PSH/PMH/FH/SH: A ten system review of systems was performed and is negative other than what is in the HPI. Tobacco:  reports that he has never smoked. He has never used smokeless tobacco.   There were no vitals taken for this visit. Physical Examination:   He is an awake alert pleasant gentleman ambulate with an antalgic gait on the left side. The right knee has a range of motion of 0 to 130 degrees   Global right knee pain   negative Lachman,   negative anterior drawer,    negative posterior drawer   negative pivot. Good tibial step-off,    No varus or valgus laxity at 0 or 30 degrees. Negative lateral joint line tenderness    negative lateral Dejuan. Negative medial joint line tenderness   negative medial Dejuan. No evidence of any posterolateral instability. No patellofemoral pain. Negative compression,    negative apprehension   no effusion. Calves Are non-tender,   neurovascularly patient is intact. Negative Homans. MPFL is non-tender. Patient Can fully extend the knee. Good quad tone   No erythema. Negative Dial test.         Left knee has a medial parapatellar incision that is healed.    The left knee has a range of motion of 0 to 130 degrees   Trace Lachman,   Trace anterior drawer,    negative posterior drawer

## 2024-01-08 ENCOUNTER — CLINICAL DOCUMENTATION (OUTPATIENT)
Dept: PHYSICAL THERAPY | Age: 69
End: 2024-01-08

## 2024-02-02 DIAGNOSIS — M17.0 BILATERAL PRIMARY OSTEOARTHRITIS OF KNEE: Primary | ICD-10-CM

## 2024-02-06 ENCOUNTER — OFFICE VISIT (OUTPATIENT)
Dept: ORTHOPEDIC SURGERY | Age: 69
End: 2024-02-06
Payer: COMMERCIAL

## 2024-02-06 DIAGNOSIS — Z96.612 PRESENCE OF LEFT ARTIFICIAL SHOULDER JOINT: ICD-10-CM

## 2024-02-06 DIAGNOSIS — M17.0 BILATERAL PRIMARY OSTEOARTHRITIS OF KNEE: Primary | ICD-10-CM

## 2024-02-06 DIAGNOSIS — Z09 FOLLOW-UP EXAMINATION AFTER ORTHOPEDIC SURGERY: ICD-10-CM

## 2024-02-06 PROCEDURE — 20610 DRAIN/INJ JOINT/BURSA W/O US: CPT | Performed by: ORTHOPAEDIC SURGERY

## 2024-02-06 NOTE — PROGRESS NOTES
Progress Notes by Noé Muhammad Jr., MD at 01/04/22 0815                    Author: Noé Muhammad Jr., MD  Service: --  Author Type: Physician       Filed: 01/04/22 1214  Encounter Date: 1/4/2022  Status: Signed          : Noé Muhammad Jr., MD (Physician)                            Name: Edvin Lisa   YOB: 1955   Gender: male   MRN: [id number]              HPI: Edvin Lisa is a 67 y.o. right-hand-dominant gentleman 5 months status post reverse left total shoulder arthroplasty with a short stem press-fit delta xtend prosthesis biceps tenodesis.  He returns and notes that he is doing fantastic.  He returns for bilateral knee Durolane viscosupplementation.  Left knee hurts more than the right     ROS/Meds/PSH/PMH/FH/SH: A ten system review of systems was performed and is negative other than what is in the HPI.    Tobacco:  reports that he has never smoked. He has never used smokeless tobacco.   There were no vitals taken for this visit.       Physical Examination:   He is an awake alert pleasant gentleman ambulate with an antalgic gait on the left side.      The right knee has a range of motion of 0 to 130 degrees   Global right knee pain   negative Lachman,   negative anterior drawer,    negative posterior drawer   negative pivot.    Good tibial step-off,    No varus or valgus laxity at 0 or 30 degrees.    Negative lateral joint line tenderness    negative lateral Dejuan.    Negative medial joint line tenderness   negative medial Dejuan.    No evidence of any posterolateral instability.    No patellofemoral pain.    Negative compression,    negative apprehension   no effusion.    Calves Are non-tender,   neurovascularly patient is intact.    Negative Homans.    MPFL is non-tender.    Patient Can fully extend the knee.    Good quad tone   No erythema.    Negative Dial test.         Left knee has a medial parapatellar incision that is healed.   The left knee has a range of motion of

## 2024-02-19 RX ORDER — ERGOCALCIFEROL 1.25 MG/1
50000 CAPSULE ORAL
COMMUNITY

## 2024-02-20 ENCOUNTER — OFFICE VISIT (OUTPATIENT)
Dept: ORTHOPEDIC SURGERY | Age: 69
End: 2024-02-20
Payer: COMMERCIAL

## 2024-02-20 DIAGNOSIS — M21.612 BUNION OF GREAT TOE OF LEFT FOOT: Primary | ICD-10-CM

## 2024-02-20 DIAGNOSIS — M20.42 ACQUIRED HAMMERTOE OF LEFT FOOT: ICD-10-CM

## 2024-02-20 PROCEDURE — 1123F ACP DISCUSS/DSCN MKR DOCD: CPT | Performed by: ORTHOPAEDIC SURGERY

## 2024-02-20 PROCEDURE — 99203 OFFICE O/P NEW LOW 30 MIN: CPT | Performed by: ORTHOPAEDIC SURGERY

## 2024-02-20 NOTE — PROGRESS NOTES
Name: Edvin Lisa  YOB: 1955  Gender: male  MRN: [id number]    Summary: Severe left hallux valgus, medial eminence mass with second and third toe deformities.    Long discussion about surgical options today.  Would need to first MTP fusion, mass excision, second, third, fourth metatarsal osteotomies, second and third PIP joint arthroplasty.    At this point he states he will go home and think about surgery and call back a later date if he desires.           CC: Left Great toe aggrevation    HPI: Edvin Lisa is a 68 y.o. male who presents to me today with bunion pain.  They point directly over the first MTP joint in the medial eminence.  They have noticed this prominence for many years but the aggravation and pain is gotten worse on with sensitivity. The pain is mostly over the medial big toe at the medial eminence.  It makes shoe wear difficult.  The pain limits closed toe shoes.  They describe it as sensitivity to touch and some pain with ROM.  It hurts more with more activity.  He also has significant issues with his second and third toes and hammertoes.  He has seen 3 different doctors/4 different doctors for this and gotten varying opinions about fusions, amputation, and alternative treatments.  He comes to me today at the request of Dr. Muhammad discussed surgical intervention    ROS/Meds/PSH/PMH/FH/SH: Patient Denies fever/chills, headache, visual changes, chest pain, shortness of breath, and nausea/vomiting/diarrhea. A full list is the bottom of this note, it has been reviewed.   Tobacco:  reports that he has never smoked. He has never used smokeless tobacco.  Diabetes: None  Other: none, 81    Physical Examination:  Gen: AAOx3 NAD    LeftLower Extremity: 2+ DP. All 5 toes wwp. +SILT ssspdpt nerves. EHLFHLATGSPTPeroneal w 5/5 strength.  No blocks to motion at the Ankle, foot, toes and ROM normal compared to other side.   No signs of infection, no wounds, no ulcers.  No edema or

## 2024-02-27 ENCOUNTER — TELEPHONE (OUTPATIENT)
Dept: ORTHOPEDIC SURGERY | Age: 69
End: 2024-02-27

## 2024-02-27 NOTE — TELEPHONE ENCOUNTER
He wants to know when he gets ready to have his knee replaced, who will Dr. Muhammad recommend? Please let him know.

## 2024-02-27 NOTE — TELEPHONE ENCOUNTER
Called and spoke to pt who believes he has seen Dr. Roe in the past. Pt will call to schedule when ready.

## 2024-04-09 ENCOUNTER — TELEPHONE (OUTPATIENT)
Dept: ORTHOPEDIC SURGERY | Age: 69
End: 2024-04-09

## 2024-04-09 NOTE — TELEPHONE ENCOUNTER
Called and spoke with the patient, he has some upcoming dental procedures coming up, not sure when they will be starting, informed him he will need antibiotics the first year after his shoulder replacement. Patient understood.

## 2024-04-09 NOTE — TELEPHONE ENCOUNTER
He is about to have some dental work done and wants to know about taking antibiotics and how long he will have to do this. Please give him a call to explain it to him.

## 2024-05-14 ENCOUNTER — OFFICE VISIT (OUTPATIENT)
Dept: ORTHOPEDIC SURGERY | Age: 69
End: 2024-05-14
Payer: COMMERCIAL

## 2024-05-14 VITALS — WEIGHT: 204 LBS | BODY MASS INDEX: 29.2 KG/M2 | HEIGHT: 70 IN

## 2024-05-14 DIAGNOSIS — M17.12 OSTEOARTHRITIS OF LEFT KNEE, UNSPECIFIED OSTEOARTHRITIS TYPE: ICD-10-CM

## 2024-05-14 DIAGNOSIS — M25.562 LEFT KNEE PAIN, UNSPECIFIED CHRONICITY: Primary | ICD-10-CM

## 2024-05-14 PROCEDURE — 20611 DRAIN/INJ JOINT/BURSA W/US: CPT | Performed by: PHYSICIAN ASSISTANT

## 2024-05-14 PROCEDURE — 1123F ACP DISCUSS/DSCN MKR DOCD: CPT | Performed by: PHYSICIAN ASSISTANT

## 2024-05-14 PROCEDURE — 99214 OFFICE O/P EST MOD 30 MIN: CPT | Performed by: PHYSICIAN ASSISTANT

## 2024-05-14 RX ORDER — TRIAMCINOLONE ACETONIDE 40 MG/ML
40 INJECTION, SUSPENSION INTRA-ARTICULAR; INTRAMUSCULAR ONCE
Status: COMPLETED | OUTPATIENT
Start: 2024-05-14 | End: 2024-05-14

## 2024-05-14 RX ADMIN — TRIAMCINOLONE ACETONIDE 40 MG: 40 INJECTION, SUSPENSION INTRA-ARTICULAR; INTRAMUSCULAR at 10:00

## 2024-05-14 NOTE — PROGRESS NOTES
Name: Edvin Lisa  YOB: 1955  Gender: male  MRN: [id number]      Referred by:  Dr. Muhammad    CC:   Chief Complaint   Patient presents with    Knee Pain     Left knee-will xray today         DIAGNOSIS:   Encounter Diagnoses   Name Primary?    Left knee pain, unspecified chronicity Yes    Osteoarthritis of left knee, unspecified osteoarthritis type         HPI:   The left knee pain pain has been present for several years, is mild to moderate severity at times, with some progressive worsening  It does not hurt at night when sleeping.  The pain is located over the left knee.  It does hurt to walk and gets worse with increased distances.   The pain does not radiate down the leg.  Numbness and tingling are not noted.   Treatment so far has been Tylenol, topical Voltaren, compresive wrap, and several Durolane injections over the past 2 years which still provide some relief.  H/o left knee open debridement and ligament reconstruction in 1988.  He is 9 months s/p left reverse TSA which is doing well.  He also reports h/o a-fib, takes Eliquis.        Current Outpatient Medications:     ergocalciferol (ERGOCALCIFEROL) 1.25 MG (39465 UT) capsule, Take 1 capsule by mouth Twice a Week, Disp: , Rfl:     apixaban (ELIQUIS) 5 MG TABS tablet, Take 1 tablet by mouth 2 times daily, Disp: , Rfl:     naloxone 4 MG/0.1ML LIQD nasal spray, 1 spray by Nasal route as needed for Opioid Reversal, Disp: 1 each, Rfl: 0    promethazine (PHENERGAN) 25 MG tablet, Take 1 tablet by mouth every 6 hours as needed for Nausea, Disp: 20 tablet, Rfl: 0    gabapentin (NEURONTIN) 100 MG capsule, Take 1 capsule by mouth 3 times daily for 30 days., Disp: 90 capsule, Rfl: 0    carvedilol (COREG) 6.25 MG tablet, Take 1 tablet by mouth 2 times daily, Disp: , Rfl:     ELIQUIS 5 MG TABS tablet, Take 1 tablet by mouth 2 times daily, Disp: , Rfl:     allopurinol (ZYLOPRIM) 100 MG tablet, Take 1 tablet by mouth 2 times daily, Disp: , Rfl:

## 2024-05-15 ENCOUNTER — TELEPHONE (OUTPATIENT)
Dept: ORTHOPEDIC SURGERY | Age: 69
End: 2024-05-15

## 2024-05-15 NOTE — TELEPHONE ENCOUNTER
EUFLE[employer] APPROVED   A#[id number]   5/14/24-11/14/24-CEG     Gels  are  ready   pt  just had a  cortisone    Spoke  with pts  wife  she  will speak to him about the  gels  and call back    Dt  5/15

## 2024-05-16 ENCOUNTER — TELEPHONE (OUTPATIENT)
Dept: ORTHOPEDIC SURGERY | Age: 69
End: 2024-05-16

## 2024-05-16 NOTE — TELEPHONE ENCOUNTER
Pt  has  a few questions  about  the  gel injections.    So far  the cortisone that he  received on the  14th   is helping.    He is asking  to get the  1st  gel   on  5/30 but  then he  will be  out  of town from the  7th thru the  18th  and  injections  2 and 3 would  be  spaced out..    If he can't do this then he doesn't  want to  do them and it might run into his  September sx time...  He doesn't know  what  to  do

## 2024-05-16 NOTE — TELEPHONE ENCOUNTER
His wife took a call yesterday about some injections. He wants to speak to someone about scheduling these injections if they were approved.

## 2024-05-17 NOTE — TELEPHONE ENCOUNTER
I  saw the  message  from Ashok-    What other  provider  here would  agree to  do the  injection?    Please  let  me know  how to schedule and with who  if  I need to  be  the one  to  call him      Lily

## 2024-05-17 NOTE — TELEPHONE ENCOUNTER
Spoke with patients wife. He is continuing to have relief from the cortisone injection so he is going to wait on starting the gel injections. They will call back for an appt if he starts to develop pain in the next few weeks.

## 2024-05-28 ENCOUNTER — TELEPHONE (OUTPATIENT)
Dept: ORTHOPEDIC SURGERY | Age: 69
End: 2024-05-28

## 2024-05-28 NOTE — TELEPHONE ENCOUNTER
He has questions about the gel injections. He says he is confused about the time lines for these. Please give him a call.

## 2024-05-29 NOTE — TELEPHONE ENCOUNTER
Left a vm letting patient know that the gel injections are done in a series of 3. He would get one, once a week, every week for 3 weeks. These can be done every 6 months as long as he is getting relief from them. If he has any further questions he can call back.

## 2024-05-30 ENCOUNTER — TELEPHONE (OUTPATIENT)
Dept: ORTHOPEDIC SURGERY | Age: 69
End: 2024-05-30

## 2024-05-30 NOTE — TELEPHONE ENCOUNTER
Let patient know that he cannot have any more injections if he is trying to have sx in August/September with any of our providers here. He is going to wait until [facility] returns and get on the schedule with him in poss August/September.

## 2024-07-23 ENCOUNTER — TELEPHONE (OUTPATIENT)
Dept: ORTHOPEDIC SURGERY | Age: 69
End: 2024-07-23

## 2024-07-23 NOTE — TELEPHONE ENCOUNTER
Patient scheduled 7/25 for 1st injection with Ashok. I don't see any availability with Ashok or DARIA after that. Please advise. Doesn't mater which location

## 2024-07-25 ENCOUNTER — OFFICE VISIT (OUTPATIENT)
Dept: ORTHOPEDIC SURGERY | Age: 69
End: 2024-07-25
Payer: COMMERCIAL

## 2024-07-25 DIAGNOSIS — M17.12 OSTEOARTHRITIS OF LEFT KNEE, UNSPECIFIED OSTEOARTHRITIS TYPE: Primary | ICD-10-CM

## 2024-07-25 PROCEDURE — 20611 DRAIN/INJ JOINT/BURSA W/US: CPT | Performed by: PHYSICIAN ASSISTANT

## 2024-07-25 RX ORDER — TRIAMCINOLONE ACETONIDE 40 MG/ML
40 INJECTION, SUSPENSION INTRA-ARTICULAR; INTRAMUSCULAR ONCE
Status: COMPLETED | OUTPATIENT
Start: 2024-07-25 | End: 2024-07-25

## 2024-07-25 RX ORDER — HYALURONATE SODIUM 10 MG/ML
20 SYRINGE (ML) INTRAARTICULAR ONCE
Status: COMPLETED | OUTPATIENT
Start: 2024-07-25 | End: 2024-07-25

## 2024-07-25 RX ADMIN — TRIAMCINOLONE ACETONIDE 40 MG: 40 INJECTION, SUSPENSION INTRA-ARTICULAR; INTRAMUSCULAR at 09:02

## 2024-07-25 RX ADMIN — Medication 20 MG: at 08:45

## 2024-07-25 NOTE — PROGRESS NOTES
Name: Edvin Lisa  YOB: 1955  Gender: male  MRN: [id number]        Current Outpatient Medications:     ergocalciferol (ERGOCALCIFEROL) 1.25 MG (49519 UT) capsule, Take 1 capsule by mouth Twice a Week, Disp: , Rfl:     apixaban (ELIQUIS) 5 MG TABS tablet, Take 1 tablet by mouth 2 times daily, Disp: , Rfl:     naloxone 4 MG/0.1ML LIQD nasal spray, 1 spray by Nasal route as needed for Opioid Reversal, Disp: 1 each, Rfl: 0    promethazine (PHENERGAN) 25 MG tablet, Take 1 tablet by mouth every 6 hours as needed for Nausea, Disp: 20 tablet, Rfl: 0    gabapentin (NEURONTIN) 100 MG capsule, Take 1 capsule by mouth 3 times daily for 30 days., Disp: 90 capsule, Rfl: 0    carvedilol (COREG) 6.25 MG tablet, Take 1 tablet by mouth 2 times daily, Disp: , Rfl:     ELIQUIS 5 MG TABS tablet, Take 1 tablet by mouth 2 times daily, Disp: , Rfl:     allopurinol (ZYLOPRIM) 100 MG tablet, Take 1 tablet by mouth 2 times daily, Disp: , Rfl:     acetaminophen (TYLENOL) 500 MG tablet, Take 1 tablet by mouth every 6 hours as needed for Pain, Disp: , Rfl:     diclofenac sodium (VOLTAREN) 1 % GEL, Apply topically 2 times daily, Disp: , Rfl:   No Known Allergies    CC: Left knee pain    DIAGNOSIS:   Encounter Diagnosis   Name Primary?    Osteoarthritis of left knee, unspecified osteoarthritis type Yes        Impression: Osteoarthritis the left knee    Procedure: Kenalog injection with US guidance    Radiology Report:  [employer] US unit with a variable frequency (6.0-15.0 MHz) linear transducer was used to examine the intracondylar notch, retropatellar fat pad, patella tendon, patella, and tibia as well as to ensure adequate needle placement.  Injection image was obtained and placed in the patient's permanent chart.    Procedure Note: Time out was performed which included identifying the patient by name and date of birth.  The procedure site was identified with all present in agreement.  The left knee was prepped with alcohol. Then, under

## 2024-09-17 ENCOUNTER — HOSPITAL ENCOUNTER (EMERGENCY)
Age: 69
Discharge: HOME | End: 2024-09-17
Attending: EMERGENCY MEDICINE
Payer: COMMERCIAL

## 2024-09-17 ENCOUNTER — APPOINTMENT (OUTPATIENT)
Dept: GENERAL RADIOLOGY | Age: 69
End: 2024-09-17
Payer: COMMERCIAL

## 2024-09-17 VITALS
DIASTOLIC BLOOD PRESSURE: 75 MMHG | SYSTOLIC BLOOD PRESSURE: 132 MMHG | BODY MASS INDEX: 26.32 KG/M2 | TEMPERATURE: 97.7 F | WEIGHT: 188 LBS | OXYGEN SATURATION: 98 % | HEART RATE: 79 BPM | RESPIRATION RATE: 18 BRPM | HEIGHT: 71 IN

## 2024-09-17 DIAGNOSIS — S22.32XA CLOSED FRACTURE OF ONE RIB OF LEFT SIDE, INITIAL ENCOUNTER: Primary | ICD-10-CM

## 2024-09-17 PROCEDURE — 99283 EMERGENCY DEPT VISIT LOW MDM: CPT

## 2024-09-17 PROCEDURE — 71101 X-RAY EXAM UNILAT RIBS/CHEST: CPT

## 2024-09-17 RX ORDER — HYDROCODONE BITARTRATE AND ACETAMINOPHEN 5; 325 MG/1; MG/1
1 TABLET ORAL EVERY 8 HOURS PRN
Qty: 15 TABLET | Refills: 0 | Status: SHIPPED | OUTPATIENT
Start: 2024-09-17 | End: 2024-09-22

## 2024-09-17 ASSESSMENT — LIFESTYLE VARIABLES
HOW OFTEN DO YOU HAVE A DRINK CONTAINING ALCOHOL: NEVER
HOW MANY STANDARD DRINKS CONTAINING ALCOHOL DO YOU HAVE ON A TYPICAL DAY: PATIENT DOES NOT DRINK

## 2024-09-17 ASSESSMENT — PAIN DESCRIPTION - ORIENTATION: ORIENTATION: LEFT

## 2024-09-17 ASSESSMENT — PAIN - FUNCTIONAL ASSESSMENT: PAIN_FUNCTIONAL_ASSESSMENT: 0-10

## 2024-09-17 ASSESSMENT — PAIN DESCRIPTION - LOCATION: LOCATION: ARM

## 2024-09-17 ASSESSMENT — PAIN SCALES - GENERAL: PAINLEVEL_OUTOF10: 4

## 2024-10-10 ENCOUNTER — PATIENT MESSAGE (OUTPATIENT)
Dept: ORTHOPEDIC SURGERY | Age: 69
End: 2024-10-10

## 2024-10-14 ENCOUNTER — TELEPHONE (OUTPATIENT)
Dept: ORTHOPEDIC SURGERY | Age: 69
End: 2024-10-14

## 2024-10-14 DIAGNOSIS — M17.12 OSTEOARTHRITIS OF LEFT KNEE, UNSPECIFIED OSTEOARTHRITIS TYPE: Primary | ICD-10-CM

## 2024-10-14 NOTE — TELEPHONE ENCOUNTER
Appointment Request  (Newest Message First)  View All Conversations on this Encounter  Edvin Mirza [facility]  Staff20 hours ago (6:22 PM)     JUAN CARLOS Gamboa, I want to get the series of 3 gel injections       You  Edvin Sin days ago     DEYANIRA Pardo, are you wanting to get a cortisone injection or the series of 3 gel injections?  Thank you.          Bee, Referral Specialist-Appointment Scheduling     [facility]     [phone]        Edvin Mirza [facility]  Staff4 days ago     JUAN CARLOS  Appointment Request From: Edvin Lisa     With Provider: TERESA Rose [CHIKI SHAFFER [facility]]     Preferred Date Range: 11/4/2024 - 11/22/2024     Preferred Times: Monday Morning, Tuesday Morning, Wednesday Morning, Thursday Morning, Friday Morning     Reason for visit: Request an Appointment     Comments:  I WANT TO SCHEDULE INJECTIONS FOR MY LEFT KNEE. I WOULD LIKE THE MEDICINE

## 2024-10-17 ENCOUNTER — TELEPHONE (OUTPATIENT)
Dept: ORTHOPEDIC SURGERY | Age: 69
End: 2024-10-17

## 2024-10-17 NOTE — TELEPHONE ENCOUNTER
Patient sent a [employer] appointment request message stating that he needs to reschedule the injection appointment on 11/27/24 due to his work schedule.  When  can he be scheduled for the 3rd injection please?

## 2024-11-13 ENCOUNTER — OFFICE VISIT (OUTPATIENT)
Dept: ORTHOPEDIC SURGERY | Age: 69
End: 2024-11-13

## 2024-11-13 DIAGNOSIS — M17.12 OSTEOARTHRITIS OF LEFT KNEE, UNSPECIFIED OSTEOARTHRITIS TYPE: Primary | ICD-10-CM

## 2024-11-13 RX ORDER — HYALURONATE SODIUM 10 MG/ML
20 SYRINGE (ML) INTRAARTICULAR ONCE
Status: COMPLETED | OUTPATIENT
Start: 2024-11-13 | End: 2024-11-13

## 2024-11-13 RX ADMIN — Medication 20 MG: at 08:29

## 2024-11-13 NOTE — PROGRESS NOTES
Name: Edvin Lisa  YOB: 1955  Gender: male  MRN: [id number]      CC: Left Knee Pain     PROCEDURE: 1 of 3     DIAGNOSIS:   Encounter Diagnosis   Name Primary?    Osteoarthritis of left knee, unspecified osteoarthritis type Yes        [employer] US unit with variable frequency (6.0-15.0 MHz) linear transducer was used to visualize the retropatellar fat pad, patella tendon, patella, tibia, and to ensure proper intra-articular needle placement.  Injection image was saved to patient's permanent chart.    Procedure Note: Time out was performed which included identifying the patient by name and date of birth.  The procedure site was identified with all present in agreement.   The patient was placed in upright position with knee hanging freely from exam table.  The left knee was prepped in sterile fashion using alcohol wipe.  Using [employer] ultrasound guidance, a 22 gauge needle was then introduced into the knee joint from an infrapatellar approach and 2 mL of Euflexxa was injected freely.  The needle was then removed, pressure hemostasis achieved, injection site was cleansed with alcohol wipe and dressed with band aid.    The patient tolerated the procedure without complication.  The patient  will follow up as scheduled.

## 2024-11-20 ENCOUNTER — OFFICE VISIT (OUTPATIENT)
Dept: ORTHOPEDIC SURGERY | Age: 69
End: 2024-11-20
Payer: COMMERCIAL

## 2024-11-20 DIAGNOSIS — M17.12 OSTEOARTHRITIS OF LEFT KNEE, UNSPECIFIED OSTEOARTHRITIS TYPE: Primary | ICD-10-CM

## 2024-11-20 PROCEDURE — 20611 DRAIN/INJ JOINT/BURSA W/US: CPT | Performed by: PHYSICIAN ASSISTANT

## 2024-11-20 RX ORDER — HYALURONATE SODIUM 10 MG/ML
20 SYRINGE (ML) INTRAARTICULAR ONCE
Status: COMPLETED | OUTPATIENT
Start: 2024-11-20 | End: 2024-11-20

## 2024-11-20 RX ADMIN — Medication 20 MG: at 08:40

## 2024-11-20 NOTE — PROGRESS NOTES
Name: Edvin Lisa  YOB: 1955  Gender: male  MRN: [id number]      CC: Left Knee Pain     PROCEDURE: 2 of 3 HP    DIAGNOSIS:   Encounter Diagnosis   Name Primary?    Osteoarthritis of left knee, unspecified osteoarthritis type Yes        [employer] US unit with variable frequency (6.0-15.0 MHz) linear transducer was used to visualize the retropatellar fat pad, patella tendon, patella, tibia, and to ensure proper intra-articular needle placement.  Injection image was saved to patient's permanent chart.    Procedure Note: Time out was performed which included identifying the patient by name and date of birth.  The procedure site was identified with all present in agreement.   The patient was placed in upright position with knee hanging freely from exam table.  The left knee was prepped in sterile fashion using alcohol wipe.  Using [employer] ultrasound guidance, a 22 gauge needle was then introduced into the knee joint from an infrapatellar approach and 2 mL of Euflexxa was injected freely.  The needle was then removed, pressure hemostasis achieved, injection site was cleansed with alcohol wipe and dressed with band aid.    The patient tolerated the procedure without complication.  The patient  will follow up as scheduled.

## 2024-12-02 ENCOUNTER — OFFICE VISIT (OUTPATIENT)
Dept: ORTHOPEDIC SURGERY | Age: 69
End: 2024-12-02
Payer: COMMERCIAL

## 2024-12-02 DIAGNOSIS — M17.12 OSTEOARTHRITIS OF LEFT KNEE, UNSPECIFIED OSTEOARTHRITIS TYPE: Primary | ICD-10-CM

## 2024-12-02 PROCEDURE — 20611 DRAIN/INJ JOINT/BURSA W/US: CPT | Performed by: PHYSICIAN ASSISTANT

## 2024-12-02 RX ORDER — HYALURONATE SODIUM 10 MG/ML
20 SYRINGE (ML) INTRAARTICULAR ONCE
Status: COMPLETED | OUTPATIENT
Start: 2024-12-02 | End: 2024-12-02

## 2024-12-02 RX ADMIN — Medication 20 MG: at 10:07

## 2024-12-02 NOTE — PROGRESS NOTES
Name: Edvin Lisa  YOB: 1955  Gender: male  MRN: [id number]      CC: Left Knee Pain     PROCEDURE: 3 of 3 HP    DIAGNOSIS:   Encounter Diagnosis   Name Primary?    Osteoarthritis of left knee, unspecified osteoarthritis type Yes        [employer] US unit with variable frequency (6.0-15.0 MHz) linear transducer was used to visualize the retropatellar fat pad, patella tendon, patella, tibia, and to ensure proper intra-articular needle placement.  Injection image was saved to patient's permanent chart.    Procedure Note: Time out was performed which included identifying the patient by name and date of birth.  The procedure site was identified with all present in agreement.   The patient was placed in upright position with knee hanging freely from exam table.  The left knee was prepped in sterile fashion using alcohol wipe.  Using [employer] ultrasound guidance, a 22 gauge needle was then introduced into the knee joint from an infrapatellar approach and 2 mL of Euflexxa was injected freely.  The needle was then removed, pressure hemostasis achieved, injection site was cleansed with alcohol wipe and dressed with band aid.    The patient tolerated the procedure without complication.  He reports moderate relief of his left knee pain thus far and plans to return in 6 months to repeat a HP.  He understands that end treatment would be left TKA.

## 2025-02-04 ENCOUNTER — TELEPHONE (OUTPATIENT)
Dept: ORTHOPEDIC SURGERY | Age: 70
End: 2025-02-04

## 2025-02-04 NOTE — TELEPHONE ENCOUNTER
Spoke to patient. He is going to have surgery at [facility] on 5/8. He was made aware that he will need cardiac clearance before surgery. He has A-fib and on blood thinners. He is wanting a cortisone injection so he will come in tomorrow at  for this. Patient is aware this is the last injection he can have before surgery.

## 2025-02-07 DIAGNOSIS — M17.12 OSTEOARTHRITIS OF LEFT KNEE, UNSPECIFIED OSTEOARTHRITIS TYPE: Primary | ICD-10-CM

## 2025-02-13 DIAGNOSIS — M17.12 OSTEOARTHRITIS OF LEFT KNEE, UNSPECIFIED OSTEOARTHRITIS TYPE: Primary | ICD-10-CM

## 2025-02-18 ENCOUNTER — TELEPHONE (OUTPATIENT)
Dept: ORTHOPEDIC SURGERY | Age: 70
End: 2025-02-18

## 2025-02-18 NOTE — TELEPHONE ENCOUNTER
[facility] is calling to see if the patient needs cardiac clearance for his surgery. He is coming in Friday to be seen by a doctor and they never received a request from our office. Please call or fax the request to them.

## 2025-02-24 ENCOUNTER — ANESTHESIA EVENT (OUTPATIENT)
Dept: SURGERY | Age: 70
End: 2025-02-24
Payer: COMMERCIAL

## 2025-02-24 ENCOUNTER — HOSPITAL ENCOUNTER (OUTPATIENT)
Dept: SURGERY | Age: 70
Discharge: HOME | End: 2025-02-27
Payer: COMMERCIAL

## 2025-02-24 ENCOUNTER — HOSPITAL ENCOUNTER (OUTPATIENT)
Dept: REHABILITATION | Age: 70
Discharge: HOME | End: 2025-02-27
Payer: COMMERCIAL

## 2025-02-24 ENCOUNTER — PREP FOR PROCEDURE (OUTPATIENT)
Dept: ORTHOPEDIC SURGERY | Age: 70
End: 2025-02-24

## 2025-02-24 VITALS
HEIGHT: 72 IN | BODY MASS INDEX: 27.36 KG/M2 | TEMPERATURE: 97.5 F | OXYGEN SATURATION: 99 % | RESPIRATION RATE: 16 BRPM | HEART RATE: 64 BPM | SYSTOLIC BLOOD PRESSURE: 121 MMHG | DIASTOLIC BLOOD PRESSURE: 69 MMHG | WEIGHT: 202 LBS

## 2025-02-24 DIAGNOSIS — Z01.818 PREOP EXAMINATION: ICD-10-CM

## 2025-02-24 DIAGNOSIS — Z01.818 PREOP EXAMINATION: Primary | ICD-10-CM

## 2025-02-24 LAB
ALBUMIN SERPL-MCNC: 3.4 G/DL (ref 3.2–4.6)
ALBUMIN/GLOB SERPL: 0.9 (ref 1–1.9)
ALP SERPL-CCNC: 54 U/L (ref 40–129)
ALT SERPL-CCNC: 36 U/L (ref 8–55)
ANION GAP SERPL CALC-SCNC: 11 MMOL/L (ref 7–16)
APTT PPP: 38 SEC (ref 23.3–37.4)
AST SERPL-CCNC: 31 U/L (ref 15–37)
BASOPHILS # BLD: 0.01 K/UL (ref 0–0.2)
BASOPHILS NFR BLD: 0.1 % (ref 0–2)
BILIRUB SERPL-MCNC: 0.3 MG/DL (ref 0–1.2)
BUN SERPL-MCNC: 26 MG/DL (ref 8–23)
CALCIUM SERPL-MCNC: 9.6 MG/DL (ref 8.8–10.2)
CHLORIDE SERPL-SCNC: 101 MMOL/L (ref 98–107)
CO2 SERPL-SCNC: 27 MMOL/L (ref 20–29)
CREAT SERPL-MCNC: 0.63 MG/DL (ref 0.8–1.3)
DIFFERENTIAL METHOD BLD: ABNORMAL
EOSINOPHIL # BLD: 0.17 K/UL (ref 0–0.8)
EOSINOPHIL NFR BLD: 2.4 % (ref 0.5–7.8)
ERYTHROCYTE [DISTWIDTH] IN BLOOD BY AUTOMATED COUNT: 12.8 % (ref 11.9–14.6)
EST. AVERAGE GLUCOSE BLD GHB EST-MCNC: 122 MG/DL
GLOBULIN SER CALC-MCNC: 4 G/DL (ref 2.3–3.5)
GLUCOSE SERPL-MCNC: 103 MG/DL (ref 70–99)
HBA1C MFR BLD: 5.9 % (ref 0–5.6)
HCT VFR BLD AUTO: 41.5 % (ref 41.1–50.3)
HGB BLD-MCNC: 13.7 G/DL (ref 13.6–17.2)
IMM GRANULOCYTES # BLD AUTO: 0.03 K/UL (ref 0–0.5)
IMM GRANULOCYTES NFR BLD AUTO: 0.4 % (ref 0–5)
INR PPP: 1.2
LYMPHOCYTES # BLD: 1.47 K/UL (ref 0.5–4.6)
LYMPHOCYTES NFR BLD: 20.4 % (ref 13–44)
MCH RBC QN AUTO: 32.3 PG (ref 26.1–32.9)
MCHC RBC AUTO-ENTMCNC: 33 G/DL (ref 31.4–35)
MCV RBC AUTO: 97.9 FL (ref 82–102)
MONOCYTES # BLD: 0.58 K/UL (ref 0.1–1.3)
MONOCYTES NFR BLD: 8.1 % (ref 4–12)
MRSA DNA SPEC QL NAA+PROBE: NOT DETECTED
NEUTS SEG # BLD: 4.93 K/UL (ref 1.7–8.2)
NEUTS SEG NFR BLD: 68.6 % (ref 43–78)
NRBC # BLD: 0 K/UL (ref 0–0.2)
PLATELET # BLD AUTO: 153 K/UL (ref 150–450)
PMV BLD AUTO: 9.3 FL (ref 9.4–12.3)
POTASSIUM SERPL-SCNC: 4.2 MMOL/L (ref 3.5–5.1)
PROT SERPL-MCNC: 7.4 G/DL (ref 6.3–8.2)
PROTHROMBIN TIME: 15.8 SEC (ref 11.3–14.9)
RBC # BLD AUTO: 4.24 M/UL (ref 4.23–5.6)
S AUREUS CPE NOSE QL NAA+PROBE: NOT DETECTED
SODIUM SERPL-SCNC: 139 MMOL/L (ref 136–145)
WBC # BLD AUTO: 7.2 K/UL (ref 4.3–11.1)

## 2025-02-24 PROCEDURE — 85025 COMPLETE CBC W/AUTO DIFF WBC: CPT

## 2025-02-24 PROCEDURE — 87641 MR-STAPH DNA AMP PROBE: CPT

## 2025-02-24 PROCEDURE — 98960 EDU&TRN PT SELF-MGMT NQHP 1: CPT

## 2025-02-24 PROCEDURE — 83036 HEMOGLOBIN GLYCOSYLATED A1C: CPT

## 2025-02-24 PROCEDURE — 80053 COMPREHEN METABOLIC PANEL: CPT

## 2025-02-24 PROCEDURE — 85610 PROTHROMBIN TIME: CPT

## 2025-02-24 PROCEDURE — 97161 PT EVAL LOW COMPLEX 20 MIN: CPT

## 2025-02-24 PROCEDURE — 85730 THROMBOPLASTIN TIME PARTIAL: CPT

## 2025-02-24 PROCEDURE — 94760 N-INVAS EAR/PLS OXIMETRY 1: CPT

## 2025-02-24 RX ORDER — ASCORBIC ACID 500 MG
1000 TABLET ORAL DAILY
COMMUNITY

## 2025-02-24 RX ORDER — VIT C/B6/B5/MAGNESIUM/HERB 173 50-5-6-5MG
1000 CAPSULE ORAL DAILY
COMMUNITY

## 2025-02-24 RX ORDER — SODIUM CHLORIDE 0.9 % (FLUSH) 0.9 %
5-40 SYRINGE (ML) INJECTION EVERY 12 HOURS SCHEDULED
Status: CANCELLED | OUTPATIENT
Start: 2025-02-24

## 2025-02-24 RX ORDER — SODIUM CHLORIDE 9 MG/ML
INJECTION, SOLUTION INTRAVENOUS PRN
Status: CANCELLED | OUTPATIENT
Start: 2025-02-24

## 2025-02-24 RX ORDER — MAGNESIUM 30 MG
30 TABLET ORAL DAILY
COMMUNITY

## 2025-02-24 RX ORDER — ASPIRIN 81 MG/1
81 TABLET ORAL NIGHTLY
COMMUNITY

## 2025-02-24 RX ORDER — LANOLIN ALCOHOL/MO/W.PET/CERES
2000 CREAM (GRAM) TOPICAL DAILY
COMMUNITY

## 2025-02-24 RX ORDER — FOLIC ACID 1 MG/1
1 TABLET ORAL DAILY
COMMUNITY

## 2025-02-24 RX ORDER — LOSARTAN POTASSIUM 25 MG/1
25 TABLET ORAL DAILY
COMMUNITY

## 2025-02-24 RX ORDER — BIOTIN 10000 MCG
CAPSULE ORAL
COMMUNITY

## 2025-02-24 RX ORDER — SODIUM CHLORIDE 0.9 % (FLUSH) 0.9 %
5-40 SYRINGE (ML) INJECTION PRN
Status: CANCELLED | OUTPATIENT
Start: 2025-02-24

## 2025-02-24 RX ORDER — MULTIVITAMIN WITH IRON
200 TABLET ORAL DAILY
COMMUNITY

## 2025-02-24 RX ORDER — ATORVASTATIN CALCIUM 20 MG/1
20 TABLET, FILM COATED ORAL NIGHTLY
COMMUNITY

## 2025-02-24 ASSESSMENT — KOOS JR
RISING FROM SITTING: MODERATE
GOING UP OR DOWN STAIRS: MILD
KOOS JR TOTAL INTERVAL SCORE: 61.583
STRAIGHTENING KNEE FULLY: MILD
STANDING UPRIGHT: MILD
BENDING TO THE FLOOR TO PICK UP OBJECT: MILD
HOW SEVERE IS YOUR KNEE STIFFNESS AFTER FIRST WAKING IN MORNING: MODERATE
TWISING OR PIVOTING ON KNEE: MODERATE

## 2025-02-24 ASSESSMENT — PAIN DESCRIPTION - LOCATION: LOCATION: KNEE

## 2025-02-24 ASSESSMENT — PAIN SCALES - GENERAL: PAINLEVEL_OUTOF10: 0

## 2025-02-24 ASSESSMENT — PAIN DESCRIPTION - DESCRIPTORS: DESCRIPTORS: DULL;STABBING

## 2025-02-24 ASSESSMENT — PULMONARY FUNCTION TESTS
FEV1 (LITERS): 1.58
FEV1 (%PREDICTED): 109

## 2025-02-24 NOTE — PERIOP NOTE
PLEASE CONTINUE TAKING ALL PRESCRIPTION MEDICATIONS UP TO THE DAY OF SURGERY UNLESS OTHERWISE DIRECTED BELOW.     DISCONTINUE all over the counter vitamins, supplements, and herbals 21 days prior to surgery. DISCONTINUE Non-Steroidal Anti-Inflammatory (NSAIDS) such as Advil, Ibuprofen, Motrin, Naproxen, and Aleve 21 days prior to surgery.      *IT IS OK TO TAKE TYLENOL, Allergy medication, and indigestion medications*     Prescription medications to HOLD     Hold Eliquis 3 days (72 hours) prior to surgery.        *CONTINUE NIGHTLY 81 MG ASPIRIN*      CONTINUE all other prescriptions like normal up until the day of surgery--TAKE ONLY THE BELOW MEDICATIONS THE DAY OF SURGERY.    Carvedilol    Allopurinol           Comments   The day before surgery please take 2 Tylenol in the morning and then again before bed. You may use either regular or extra strength.        Bring CPAP, Dynahex soap, and incentive spirometer back to the hospital.          Please do not bring home medications with you on the day of surgery unless otherwise directed by your nurse.  If you are instructed to bring home medications, please give them to your nurse as they will be administered by the nursing staff.     If you have any questions, please call [facility] [phone].     A copy of this note was provided to the patient for reference.

## 2025-02-24 NOTE — PROGRESS NOTES
Edvin Lisa  : 1955  Primary: Fl [employer]  Secondary:  [facility] [place] [address]  [phone]      Physical Therapy Prehab Evaluation Summary:2025   Time In/Out   PT Charge Capture  Episode     MEDICAL/REFERRING DIAGNOSIS: Unilateral primary osteoarthritis, left knee [M17.12]  REFERRING PHYSICIAN: Deric Roe,*    Treatment Diagnosis:   Pain in Left Knee (M25.562)  Stiffness of Left Knee, Not elsewhere classified (M25.662)  Difficulty in walking, Not elsewhere classified (R26.2)    DATE OF SURGERY: 3/14/25  Assessment:   COMMENTS:  Mr. Lisa is present for a Prehab Physical Therapy Assessment for their upcoming left TKA. They are here with wife . After discussing the surgical admission options and discharge plans, they are planning on discharging on day of surgery.    He's had a left reverse TSA.  He has a recliner on main floor.    PROBLEM LIST:   (Impacting functional limitations):  Mr. Lisa presents with the following lower extremity(s) problems:  Strength  Range of Motion  Home Exercise Program  Pain INTERVENTIONS PLANNED:   (Benefits and precautions of physical therapy have been discussed with the patient.)  Home Exercise Program  Educational Discussion       GOALS: (Goals have been discussed and agreed upon with patient.)  Discharge Goals: Time Frame: 1 Day  Patient will demonstrate independence with a home exercise program designed to increase strength, range of motion, and pain control to minimize functional deficits and optimize patient for total joint replacement.    Subjective:   Past Medical History/Comorbidities:   Mr. Lisa  has a past medical history of CAD (coronary artery disease), Chronic anticoagulation, Dilated cardiomyopathy (HCC), Essential hypertension, History of COVID-19, Marijuana use, DONITA (obstructive sleep apnea), Permanent atrial fibrillation (HCC), and Varicose veins of both lower extremities.  Mr. Lisa

## 2025-02-24 NOTE — PERIOP NOTE
PT/PTT results to be reviewed by anesthesia--charge nurse to f/u. All other results, listed below, are within anesthesia guidelines. Labs automatically routed to ordering provider via Epic documentation.       Latest Reference Range & Units 02/24/25 09:14   Sodium 136 - 145 mmol/L 139   Potassium 3.5 - 5.1 mmol/L 4.2   Chloride 98 - 107 mmol/L 101   CARBON DIOXIDE 20 - 29 mmol/L 27   BUN,BUNPL 8 - 23 MG/DL 26 (H)   Creatinine 0.80 - 1.30 MG/DL 0.63 (L)   Anion Gap 7 - 16 mmol/L 11   Est, Glom Filt Rate >60 ml/min/1.73m2 >90   Glucose 70 - 99 mg/dL 103 (H)   Calcium 8.8 - 10.2 MG/DL 9.6   Albumin/Globulin Ratio 1.0 - 1.9   0.9 (L)   Total Protein 6.3 - 8.2 g/dL 7.4   Albumin 3.2 - 4.6 g/dL 3.4   Globulin 2.3 - 3.5 g/dL 4.0 (H)   Alkaline Phosphatase 40 - 129 U/L 54   ALT 8 - 55 U/L 36   AST 15 - 37 U/L 31   Total Bilirubin 0.0 - 1.2 MG/DL 0.3   Hemoglobin A1C 0 - 5.6 % 5.9 (H)   eAG (mg/dL) mg/dL 122   WBC 4.3 - 11.1 K/uL 7.2   RBC 4.23 - 5.6 M/uL 4.24   Hemoglobin Quant 13.6 - 17.2 g/dL 13.7   Hematocrit 41.1 - 50.3 % 41.5   MCV 82.0 - 102.0 FL 97.9   MCH 26.1 - 32.9 PG 32.3   MCHC 31.4 - 35.0 g/dL 33.0   MPV 9.4 - 12.3 FL 9.3 (L)   RDW 11.9 - 14.6 % 12.8   Platelet Count 150 - 450 K/uL 153   Neutrophils % 43.0 - 78.0 % 68.6   Lymphocyte % 13.0 - 44.0 % 20.4   Monocytes % 4.0 - 12.0 % 8.1   Eosinophils % 0.5 - 7.8 % 2.4   Basophils % 0.0 - 2.0 % 0.1   Neutrophils Absolute 1.70 - 8.20 K/UL 4.93   Lymphocytes Absolute 0.50 - 4.60 K/UL 1.47   Monocytes Absolute 0.10 - 1.30 K/UL 0.58   Eosinophils Absolute 0.00 - 0.80 K/UL 0.17   Basophils Absolute 0.00 - 0.20 K/UL 0.01   Differential Type -   AUTOMATED   Immature Granulocytes % 0.0 - 5.0 % 0.4   Nucleated Red Blood Cells 0.0 - 0.2 K/uL 0.00   Immature Granulocytes Absolute 0.0 - 0.5 K/UL 0.03   Prothrombin Time 11.3 - 14.9 sec 15.8 (H)   INR -   1.2   aPTT 23.3 - 37.4 SEC 38.0 (H)   MRSA/STAPH AUREUS DNA  Rpt (IP)   (H): Data is abnormally high  (L): Data is abnormally

## 2025-02-24 NOTE — PROGRESS NOTES
Call placed to [facility] requesting medication hold clearance.   
TIRED  Dry mouth or sore throat in mornin    YES                                              Rangel stage:                                     SACS score:30  Stop Bang                                  Pt. Is positive for DONITA and uses HOME CPAP and will bring to Hospital day of surgery.  Dangers of untreated DONITA explained to pt.  PT WILL NEED ASSISTANCE PLACING CPAP ON HS DURING HOSPITALIZATION.  ASSESS Q SHIFT  ALBUTEROL Q6 PRN                                            Referrals:    Pt. Phone Number:

## 2025-02-24 NOTE — PERIOP NOTE
Patient verified name and .    Order for consent was found in EHR and does match case posting; patient verified.     Type 3 surgery, JOINT assessment complete.    Labs per surgeon: CBC,CMP, A1C, PT/PTT; results pending.   Labs per anesthesia protocol: no additional  EKG: Completed 25 during Cardiology office visit; EKG results and most recent Echo (9/3/24) to be reviewed by anesthesia.     Surgical clearance found in most recent cardiology office note (25), \"I believe this patient is at a reasonable risk from a cardiac standpoint for surgery. Medications should be continued in the perioperative period.\"    Eliquis hold was not addressed during visit. Clearance to hold Eliquis 3 days prior to surgery requested from Cardiologist. Charge nurse to f/u.       MRSA/MSSA swab collected per policy. MD to consult pharmacy to dose Vanc if appropriate.     Hospital approved surgical skin cleanser and instructions to return bottle on DOS given per hospital policy.    Patient provided with handouts including Guide to Surgery, Pain Management, Preventing Surgical Site Infections, and [facility] Brochure.    Patient answered medical/surgical history questions at their best of ability. All prior to admission medications documented in Epic. Original medication prescription bottle was not visualized during patient appointment.     Patient instructed to hold all vitamins 3 weeks prior to surgery and NSAIDS 21 days prior to surgery.     Hold Eliquis 3 days (72 hours) prior to surgery.     DO NOT STOP NIGHTLY 81 MG ASA.     Patient teach back successful and patient demonstrates knowledge of instruction.

## 2025-02-24 NOTE — PERIOP NOTE
MEDICATION CLEARANCE    Surgical, Procedural, or Medication Clearance    Physician or Practice Requesting Clearance: [facility]  : Roslyn Ramsey RN  Contact Phone Number: [phone]  Contact Fax Number: [phone]  Date of Surgery/Procedure: 3/14/25  Type of Surgery or Procedure: Left TKA  Type of Anesthesia: SPINAL  Medication to hold: Eliquis  Requested # of days to hold: 3 days (72 hours)

## 2025-02-24 NOTE — PERIOP NOTE
PT/PTT results reviewed by anesthesia, per Dr. Avila \"Repeat PT/INR am of surgery.\" Order signed and held in EHR.

## 2025-02-25 DIAGNOSIS — Z96.652 S/P TOTAL KNEE ARTHROPLASTY, LEFT: Primary | ICD-10-CM

## 2025-02-25 ASSESSMENT — PROMIS GLOBAL HEALTH SCALE
IN GENERAL, WOULD YOU SAY YOUR HEALTH IS...[ON A SCALE OF 1 (POOR) TO 5 (EXCELLENT)]: VERY GOOD
IN GENERAL, HOW WOULD YOU RATE YOUR PHYSICAL HEALTH [ON A SCALE OF 1 (POOR) TO 5 (EXCELLENT)]?: VERY GOOD
IN GENERAL, WOULD YOU SAY YOUR QUALITY OF LIFE IS...[ON A SCALE OF 1 (POOR) TO 5 (EXCELLENT)]: VERY GOOD
IN GENERAL, HOW WOULD YOU RATE YOUR SATISFACTION WITH YOUR SOCIAL ACTIVITIES AND RELATIONSHIPS [ON A SCALE OF 1 (POOR) TO 5 (EXCELLENT)]?: VERY GOOD
TO WHAT EXTENT ARE YOU ABLE TO CARRY OUT YOUR EVERYDAY PHYSICAL ACTIVITIES SUCH AS WALKING, CLIMBING STAIRS, CARRYING GROCERIES, OR MOVING A CHAIR [ON A SCALE OF 1 (NOT AT ALL) TO 5 (COMPLETELY)]?: MOSTLY
SUM OF RESPONSES TO QUESTIONS 3, 6, 7, & 8: 16
SUM OF RESPONSES TO QUESTIONS 2, 4, 5, & 10: 17
IN GENERAL, HOW WOULD YOU RATE YOUR MENTAL HEALTH, INCLUDING YOUR MOOD AND YOUR ABILITY TO THINK [ON A SCALE OF 1 (POOR) TO 5 (EXCELLENT)]?: EXCELLENT
IN GENERAL, PLEASE RATE HOW WELL YOU CARRY OUT YOUR USUAL SOCIAL ACTIVITIES (INCLUDES ACTIVITIES AT HOME, AT WORK, AND IN YOUR COMMUNITY, AND RESPONSIBILITIES AS A PARENT, CHILD, SPOUSE, EMPLOYEE, FRIEND, ETC) [ON A SCALE OF 1 (POOR) TO 5 (EXCELLENT)]?: VERY GOOD
IN THE PAST 7 DAYS, HOW OFTEN HAVE YOU BEEN BOTHERED BY EMOTIONAL PROBLEMS, SUCH AS FEELING ANXIOUS, DEPRESSED, OR IRRITABLE [ON A SCALE FROM 1 (NEVER) TO 5 (ALWAYS)]?: RARELY
IN THE PAST 7 DAYS, HOW WOULD YOU RATE YOUR PAIN ON AVERAGE [ON A SCALE FROM 0 (NO PAIN) TO 10 (WORST IMAGINABLE PAIN)]?: 3
WHO IS THE PERSON COMPLETING THE PROMIS V1.1 SURVEY?: SELF
HOW IS THE PROMIS V1.1 BEING ADMINISTERED?: PAPER

## 2025-03-04 ENCOUNTER — CLINICAL DOCUMENTATION (OUTPATIENT)
Dept: ORTHOPEDIC SURGERY | Age: 70
End: 2025-03-04

## 2025-03-07 ENCOUNTER — OFFICE VISIT (OUTPATIENT)
Dept: ORTHOPEDIC SURGERY | Age: 70
End: 2025-03-07

## 2025-03-07 DIAGNOSIS — M17.12 OSTEOARTHRITIS OF LEFT KNEE, UNSPECIFIED OSTEOARTHRITIS TYPE: Primary | ICD-10-CM

## 2025-03-07 RX ORDER — METHOCARBAMOL 750 MG/1
750 TABLET, FILM COATED ORAL 4 TIMES DAILY PRN
Qty: 30 TABLET | Refills: 0 | Status: SHIPPED | OUTPATIENT
Start: 2025-03-07 | End: 2025-03-17

## 2025-03-07 RX ORDER — OXYCODONE HYDROCHLORIDE 5 MG/1
5-10 TABLET ORAL EVERY 4 HOURS PRN
Qty: 60 TABLET | Refills: 0 | Status: SHIPPED | OUTPATIENT
Start: 2025-03-07 | End: 2025-03-12

## 2025-03-07 RX ORDER — ONDANSETRON 4 MG/1
4 TABLET, FILM COATED ORAL EVERY 8 HOURS PRN
Qty: 20 TABLET | Refills: 0 | Status: SHIPPED | OUTPATIENT
Start: 2025-03-07

## 2025-03-07 NOTE — PROGRESS NOTES
the patient.   The concerns with functional limitations are increased and no longer responding to conservative measures which have been listed in the HPI above.  Due to deterioration in their quality of life the patient is ultimately in need of left total knee replacement surgery which is scheduled for 3/14 with Dr. Roe.  We discussed specific risks associated with knee replacement.  This includes a risk of infection, dislocation, or general medical risks of surgery such as stroke, heart attack, and blood clot.  Postop scripts have been E scribed to patient's pharmacy.    TKA - Today we also discussed knee replacement surgery, and implant selection which will be a robotic assisted total knee utilizing a Rincon implant.  They are aware of the 1% risk of infection.  They were also informed of the possibility of stroke, heart attack and blood clot; any of which could result in further hospitalization or death.     Approximately 30 minutes was spent reviewing the medical record, imaging, performing history and physical examination, discussing the diagnosis and treatment plan with the patient, and completing documentation for this visit.

## 2025-03-14 ENCOUNTER — ANESTHESIA (OUTPATIENT)
Dept: SURGERY | Age: 70
End: 2025-03-14
Payer: COMMERCIAL

## 2025-03-14 ENCOUNTER — HOSPITAL ENCOUNTER (OUTPATIENT)
Age: 70
Discharge: HOME HEALTH | End: 2025-03-14
Attending: ORTHOPAEDIC SURGERY | Admitting: ORTHOPAEDIC SURGERY
Payer: COMMERCIAL

## 2025-03-14 VITALS
BODY MASS INDEX: 26.64 KG/M2 | OXYGEN SATURATION: 99 % | RESPIRATION RATE: 18 BRPM | SYSTOLIC BLOOD PRESSURE: 106 MMHG | WEIGHT: 196.7 LBS | DIASTOLIC BLOOD PRESSURE: 68 MMHG | TEMPERATURE: 97.5 F | HEIGHT: 72 IN | HEART RATE: 79 BPM

## 2025-03-14 DIAGNOSIS — M17.12 OSTEOARTHRITIS OF LEFT KNEE, UNSPECIFIED OSTEOARTHRITIS TYPE: Primary | ICD-10-CM

## 2025-03-14 PROCEDURE — 7100000010 HC PHASE II RECOVERY - FIRST 15 MIN: Performed by: ORTHOPAEDIC SURGERY

## 2025-03-14 PROCEDURE — 97165 OT EVAL LOW COMPLEX 30 MIN: CPT

## 2025-03-14 PROCEDURE — 2580000003 HC RX 258: Performed by: ORTHOPAEDIC SURGERY

## 2025-03-14 PROCEDURE — 2580000003 HC RX 258: Performed by: ANESTHESIOLOGY

## 2025-03-14 PROCEDURE — 7100000001 HC PACU RECOVERY - ADDTL 15 MIN: Performed by: ORTHOPAEDIC SURGERY

## 2025-03-14 PROCEDURE — 6360000002 HC RX W HCPCS: Performed by: NURSE ANESTHETIST, CERTIFIED REGISTERED

## 2025-03-14 PROCEDURE — 6360000002 HC RX W HCPCS: Performed by: ANESTHESIOLOGY

## 2025-03-14 PROCEDURE — 6370000000 HC RX 637 (ALT 250 FOR IP): Performed by: PHYSICIAN ASSISTANT

## 2025-03-14 PROCEDURE — 3600000015 HC SURGERY LEVEL 5 ADDTL 15MIN: Performed by: ORTHOPAEDIC SURGERY

## 2025-03-14 PROCEDURE — 2720000010 HC SURG SUPPLY STERILE: Performed by: ORTHOPAEDIC SURGERY

## 2025-03-14 PROCEDURE — 64447 NJX AA&/STRD FEMORAL NRV IMG: CPT | Performed by: ANESTHESIOLOGY

## 2025-03-14 PROCEDURE — 2709999900 HC NON-CHARGEABLE SUPPLY: Performed by: ORTHOPAEDIC SURGERY

## 2025-03-14 PROCEDURE — 6360000002 HC RX W HCPCS: Performed by: ORTHOPAEDIC SURGERY

## 2025-03-14 PROCEDURE — 3700000000 HC ANESTHESIA ATTENDED CARE: Performed by: ORTHOPAEDIC SURGERY

## 2025-03-14 PROCEDURE — 7100000000 HC PACU RECOVERY - FIRST 15 MIN: Performed by: ORTHOPAEDIC SURGERY

## 2025-03-14 PROCEDURE — 2500000003 HC RX 250 WO HCPCS: Performed by: ORTHOPAEDIC SURGERY

## 2025-03-14 PROCEDURE — 7100000011 HC PHASE II RECOVERY - ADDTL 15 MIN: Performed by: ORTHOPAEDIC SURGERY

## 2025-03-14 PROCEDURE — 3700000001 HC ADD 15 MINUTES (ANESTHESIA): Performed by: ORTHOPAEDIC SURGERY

## 2025-03-14 PROCEDURE — 97530 THERAPEUTIC ACTIVITIES: CPT

## 2025-03-14 PROCEDURE — 97161 PT EVAL LOW COMPLEX 20 MIN: CPT

## 2025-03-14 PROCEDURE — 2500000003 HC RX 250 WO HCPCS: Performed by: NURSE ANESTHETIST, CERTIFIED REGISTERED

## 2025-03-14 PROCEDURE — 6370000000 HC RX 637 (ALT 250 FOR IP): Performed by: ANESTHESIOLOGY

## 2025-03-14 PROCEDURE — 3600000005 HC SURGERY LEVEL 5 BASE: Performed by: ORTHOPAEDIC SURGERY

## 2025-03-14 PROCEDURE — C1776 JOINT DEVICE (IMPLANTABLE): HCPCS | Performed by: ORTHOPAEDIC SURGERY

## 2025-03-14 PROCEDURE — 97535 SELF CARE MNGMENT TRAINING: CPT

## 2025-03-14 PROCEDURE — 6360000002 HC RX W HCPCS: Performed by: PHYSICIAN ASSISTANT

## 2025-03-14 PROCEDURE — C1713 ANCHOR/SCREW BN/BN,TIS/BN: HCPCS | Performed by: ORTHOPAEDIC SURGERY

## 2025-03-14 DEVICE — BASEPLATE TIB SZ 5 AP49MM ML74MM KNEE TRITANIUM 4 CRUCFRM: Type: IMPLANTABLE DEVICE | Site: KNEE | Status: FUNCTIONAL

## 2025-03-14 DEVICE — INSERT TIB CNDYL STBL 5 9 MM KNEE 5/PK X3 TRIATHLON: Type: IMPLANTABLE DEVICE | Site: KNEE | Status: FUNCTIONAL

## 2025-03-14 DEVICE — COMPONENT TOT KNEE CAPPED PRIMARY K2STRYKER] STRYKER CORP]: Type: IMPLANTABLE DEVICE | Status: FUNCTIONAL

## 2025-03-14 DEVICE — CEMENT BNE 20GM HALF DOSE PMMA VISC RADPQ FAST: Type: IMPLANTABLE DEVICE | Site: KNEE | Status: FUNCTIONAL

## 2025-03-14 DEVICE — IMPLANTABLE DEVICE: Type: IMPLANTABLE DEVICE | Site: KNEE | Status: FUNCTIONAL

## 2025-03-14 DEVICE — IMPLANT PATELLAR DIA32MM THK10MM X3 ASYMMETRIC TRIATHLON: Type: IMPLANTABLE DEVICE | Site: KNEE | Status: FUNCTIONAL

## 2025-03-14 RX ORDER — SODIUM CHLORIDE 9 MG/ML
INJECTION, SOLUTION INTRAVENOUS PRN
Status: DISCONTINUED | OUTPATIENT
Start: 2025-03-14 | End: 2025-03-14 | Stop reason: HOSPADM

## 2025-03-14 RX ORDER — ROPIVACAINE HYDROCHLORIDE 2 MG/ML
INJECTION, SOLUTION EPIDURAL; INFILTRATION; PERINEURAL PRN
Status: DISCONTINUED | OUTPATIENT
Start: 2025-03-14 | End: 2025-03-14 | Stop reason: ALTCHOICE

## 2025-03-14 RX ORDER — SODIUM CHLORIDE 9 MG/ML
INJECTION, SOLUTION INTRAVENOUS CONTINUOUS
Status: DISCONTINUED | OUTPATIENT
Start: 2025-03-14 | End: 2025-03-14 | Stop reason: HOSPADM

## 2025-03-14 RX ORDER — TRANEXAMIC ACID 100 MG/ML
INJECTION, SOLUTION INTRAVENOUS
Status: DISCONTINUED | OUTPATIENT
Start: 2025-03-14 | End: 2025-03-14 | Stop reason: SDUPTHER

## 2025-03-14 RX ORDER — SODIUM CHLORIDE, SODIUM LACTATE, POTASSIUM CHLORIDE, CALCIUM CHLORIDE 600; 310; 30; 20 MG/100ML; MG/100ML; MG/100ML; MG/100ML
INJECTION, SOLUTION INTRAVENOUS CONTINUOUS
Status: DISCONTINUED | OUTPATIENT
Start: 2025-03-14 | End: 2025-03-14 | Stop reason: HOSPADM

## 2025-03-14 RX ORDER — DEXTROSE MONOHYDRATE 100 MG/ML
INJECTION, SOLUTION INTRAVENOUS CONTINUOUS PRN
Status: DISCONTINUED | OUTPATIENT
Start: 2025-03-14 | End: 2025-03-14 | Stop reason: HOSPADM

## 2025-03-14 RX ORDER — ONDANSETRON 2 MG/ML
INJECTION INTRAMUSCULAR; INTRAVENOUS
Status: DISCONTINUED | OUTPATIENT
Start: 2025-03-14 | End: 2025-03-14 | Stop reason: SDUPTHER

## 2025-03-14 RX ORDER — MIDAZOLAM HYDROCHLORIDE 2 MG/2ML
2 INJECTION, SOLUTION INTRAMUSCULAR; INTRAVENOUS
Status: COMPLETED | OUTPATIENT
Start: 2025-03-14 | End: 2025-03-14

## 2025-03-14 RX ORDER — OXYCODONE HYDROCHLORIDE 5 MG/1
5-10 TABLET ORAL EVERY 4 HOURS PRN
Qty: 60 TABLET | Refills: 0
Start: 2025-03-14 | End: 2025-03-19

## 2025-03-14 RX ORDER — GABAPENTIN 100 MG/1
100 CAPSULE ORAL 3 TIMES DAILY
Status: DISCONTINUED | OUTPATIENT
Start: 2025-03-14 | End: 2025-03-14 | Stop reason: HOSPADM

## 2025-03-14 RX ORDER — ASPIRIN 81 MG/1
81 TABLET ORAL 2 TIMES DAILY
Status: DISCONTINUED | OUTPATIENT
Start: 2025-03-14 | End: 2025-03-14 | Stop reason: HOSPADM

## 2025-03-14 RX ORDER — OXYCODONE HYDROCHLORIDE 5 MG/1
5 TABLET ORAL
Status: DISCONTINUED | OUTPATIENT
Start: 2025-03-14 | End: 2025-03-14 | Stop reason: HOSPADM

## 2025-03-14 RX ORDER — METHOCARBAMOL 750 MG/1
750 TABLET, FILM COATED ORAL 4 TIMES DAILY PRN
Status: DISCONTINUED | OUTPATIENT
Start: 2025-03-14 | End: 2025-03-14 | Stop reason: HOSPADM

## 2025-03-14 RX ORDER — LOSARTAN POTASSIUM 25 MG/1
25 TABLET ORAL DAILY
Status: DISCONTINUED | OUTPATIENT
Start: 2025-03-15 | End: 2025-03-14 | Stop reason: HOSPADM

## 2025-03-14 RX ORDER — SODIUM CHLORIDE 0.9 % (FLUSH) 0.9 %
5-40 SYRINGE (ML) INJECTION PRN
Status: DISCONTINUED | OUTPATIENT
Start: 2025-03-14 | End: 2025-03-14 | Stop reason: HOSPADM

## 2025-03-14 RX ORDER — ONDANSETRON 4 MG/1
4 TABLET, ORALLY DISINTEGRATING ORAL EVERY 8 HOURS PRN
Status: DISCONTINUED | OUTPATIENT
Start: 2025-03-14 | End: 2025-03-14 | Stop reason: HOSPADM

## 2025-03-14 RX ORDER — ACETAMINOPHEN 325 MG/1
650 TABLET ORAL EVERY 6 HOURS
Status: DISCONTINUED | OUTPATIENT
Start: 2025-03-14 | End: 2025-03-14 | Stop reason: HOSPADM

## 2025-03-14 RX ORDER — SODIUM CHLORIDE 0.9 % (FLUSH) 0.9 %
5-40 SYRINGE (ML) INJECTION EVERY 12 HOURS SCHEDULED
Status: DISCONTINUED | OUTPATIENT
Start: 2025-03-14 | End: 2025-03-14 | Stop reason: HOSPADM

## 2025-03-14 RX ORDER — PROCHLORPERAZINE EDISYLATE 5 MG/ML
5 INJECTION INTRAMUSCULAR; INTRAVENOUS
Status: DISCONTINUED | OUTPATIENT
Start: 2025-03-14 | End: 2025-03-14 | Stop reason: HOSPADM

## 2025-03-14 RX ORDER — HYDROMORPHONE HYDROCHLORIDE 1 MG/ML
0.5 INJECTION, SOLUTION INTRAMUSCULAR; INTRAVENOUS; SUBCUTANEOUS
Status: DISCONTINUED | OUTPATIENT
Start: 2025-03-14 | End: 2025-03-14 | Stop reason: HOSPADM

## 2025-03-14 RX ORDER — CARVEDILOL 25 MG/1
12.5 TABLET ORAL 2 TIMES DAILY
Status: DISCONTINUED | OUTPATIENT
Start: 2025-03-14 | End: 2025-03-14 | Stop reason: HOSPADM

## 2025-03-14 RX ORDER — DIPHENHYDRAMINE HYDROCHLORIDE 50 MG/ML
12.5 INJECTION, SOLUTION INTRAMUSCULAR; INTRAVENOUS
Status: DISCONTINUED | OUTPATIENT
Start: 2025-03-14 | End: 2025-03-14 | Stop reason: HOSPADM

## 2025-03-14 RX ORDER — ONDANSETRON 2 MG/ML
4 INJECTION INTRAMUSCULAR; INTRAVENOUS EVERY 6 HOURS PRN
Status: DISCONTINUED | OUTPATIENT
Start: 2025-03-14 | End: 2025-03-14 | Stop reason: HOSPADM

## 2025-03-14 RX ORDER — ALLOPURINOL 100 MG/1
100 TABLET ORAL 2 TIMES DAILY
Status: DISCONTINUED | OUTPATIENT
Start: 2025-03-14 | End: 2025-03-14 | Stop reason: HOSPADM

## 2025-03-14 RX ORDER — MAGNESIUM 30 MG
30 TABLET ORAL DAILY
Status: DISCONTINUED | OUTPATIENT
Start: 2025-03-14 | End: 2025-03-14

## 2025-03-14 RX ORDER — VANCOMYCIN HYDROCHLORIDE 1 G/20ML
INJECTION, POWDER, LYOPHILIZED, FOR SOLUTION INTRAVENOUS PRN
Status: DISCONTINUED | OUTPATIENT
Start: 2025-03-14 | End: 2025-03-14 | Stop reason: ALTCHOICE

## 2025-03-14 RX ORDER — PROPOFOL 10 MG/ML
INJECTION, EMULSION INTRAVENOUS
Status: DISCONTINUED | OUTPATIENT
Start: 2025-03-14 | End: 2025-03-14 | Stop reason: SDUPTHER

## 2025-03-14 RX ORDER — MEPERIDINE HYDROCHLORIDE 50 MG/ML
12.5 INJECTION INTRAMUSCULAR; INTRAVENOUS; SUBCUTANEOUS EVERY 5 MIN PRN
Status: DISCONTINUED | OUTPATIENT
Start: 2025-03-14 | End: 2025-03-14 | Stop reason: HOSPADM

## 2025-03-14 RX ORDER — EPHEDRINE SULFATE/0.9% NACL/PF 50 MG/5 ML
SYRINGE (ML) INTRAVENOUS
Status: DISCONTINUED | OUTPATIENT
Start: 2025-03-14 | End: 2025-03-14 | Stop reason: SDUPTHER

## 2025-03-14 RX ORDER — ROPIVACAINE HYDROCHLORIDE 2 MG/ML
INJECTION, SOLUTION EPIDURAL; INFILTRATION; PERINEURAL
Status: COMPLETED | OUTPATIENT
Start: 2025-03-14 | End: 2025-03-14

## 2025-03-14 RX ORDER — ASPIRIN 81 MG/1
81 TABLET ORAL NIGHTLY
Qty: 30 TABLET | Refills: 0
Start: 2025-03-14

## 2025-03-14 RX ORDER — OXYCODONE HYDROCHLORIDE 5 MG/1
10 TABLET ORAL EVERY 4 HOURS PRN
Status: DISCONTINUED | OUTPATIENT
Start: 2025-03-14 | End: 2025-03-14 | Stop reason: HOSPADM

## 2025-03-14 RX ORDER — NALOXONE HYDROCHLORIDE 0.4 MG/ML
INJECTION, SOLUTION INTRAMUSCULAR; INTRAVENOUS; SUBCUTANEOUS PRN
Status: DISCONTINUED | OUTPATIENT
Start: 2025-03-14 | End: 2025-03-14 | Stop reason: HOSPADM

## 2025-03-14 RX ORDER — MIDAZOLAM HYDROCHLORIDE 1 MG/ML
INJECTION, SOLUTION INTRAMUSCULAR; INTRAVENOUS
Status: DISCONTINUED | OUTPATIENT
Start: 2025-03-14 | End: 2025-03-14 | Stop reason: SDUPTHER

## 2025-03-14 RX ORDER — POLYETHYLENE GLYCOL 3350 17 G/17G
17 POWDER, FOR SOLUTION ORAL DAILY PRN
Status: DISCONTINUED | OUTPATIENT
Start: 2025-03-14 | End: 2025-03-14 | Stop reason: HOSPADM

## 2025-03-14 RX ORDER — ONDANSETRON 2 MG/ML
4 INJECTION INTRAMUSCULAR; INTRAVENOUS
Status: DISCONTINUED | OUTPATIENT
Start: 2025-03-14 | End: 2025-03-14 | Stop reason: HOSPADM

## 2025-03-14 RX ORDER — IBUPROFEN 600 MG/1
1 TABLET ORAL PRN
Status: DISCONTINUED | OUTPATIENT
Start: 2025-03-14 | End: 2025-03-14 | Stop reason: HOSPADM

## 2025-03-14 RX ORDER — KETOROLAC TROMETHAMINE 30 MG/ML
INJECTION, SOLUTION INTRAMUSCULAR; INTRAVENOUS PRN
Status: DISCONTINUED | OUTPATIENT
Start: 2025-03-14 | End: 2025-03-14 | Stop reason: ALTCHOICE

## 2025-03-14 RX ORDER — FENTANYL CITRATE 50 UG/ML
100 INJECTION, SOLUTION INTRAMUSCULAR; INTRAVENOUS
Status: COMPLETED | OUTPATIENT
Start: 2025-03-14 | End: 2025-03-14

## 2025-03-14 RX ORDER — LIDOCAINE HYDROCHLORIDE 10 MG/ML
1 INJECTION, SOLUTION INFILTRATION; PERINEURAL
Status: DISCONTINUED | OUTPATIENT
Start: 2025-03-14 | End: 2025-03-14 | Stop reason: HOSPADM

## 2025-03-14 RX ORDER — FENTANYL CITRATE 50 UG/ML
25 INJECTION, SOLUTION INTRAMUSCULAR; INTRAVENOUS EVERY 5 MIN PRN
Status: DISCONTINUED | OUTPATIENT
Start: 2025-03-14 | End: 2025-03-14 | Stop reason: HOSPADM

## 2025-03-14 RX ORDER — ACETAMINOPHEN 500 MG
1000 TABLET ORAL ONCE
Status: COMPLETED | OUTPATIENT
Start: 2025-03-14 | End: 2025-03-14

## 2025-03-14 RX ORDER — DEXAMETHASONE SODIUM PHOSPHATE 10 MG/ML
INJECTION, SOLUTION INTRAMUSCULAR; INTRAVENOUS
Status: COMPLETED | OUTPATIENT
Start: 2025-03-14 | End: 2025-03-14

## 2025-03-14 RX ORDER — DEXAMETHASONE SODIUM PHOSPHATE 10 MG/ML
INJECTION, SOLUTION INTRA-ARTICULAR; INTRALESIONAL; INTRAMUSCULAR; INTRAVENOUS; SOFT TISSUE
Status: DISCONTINUED | OUTPATIENT
Start: 2025-03-14 | End: 2025-03-14 | Stop reason: SDUPTHER

## 2025-03-14 RX ORDER — SENNA AND DOCUSATE SODIUM 50; 8.6 MG/1; MG/1
1 TABLET, FILM COATED ORAL 2 TIMES DAILY
Status: DISCONTINUED | OUTPATIENT
Start: 2025-03-14 | End: 2025-03-14 | Stop reason: HOSPADM

## 2025-03-14 RX ADMIN — SODIUM CHLORIDE, SODIUM LACTATE, POTASSIUM CHLORIDE, AND CALCIUM CHLORIDE: 600; 310; 30; 20 INJECTION, SOLUTION INTRAVENOUS at 08:37

## 2025-03-14 RX ADMIN — ROPIVACAINE HYDROCHLORIDE 20 ML: 2 INJECTION, SOLUTION EPIDURAL; INFILTRATION at 07:17

## 2025-03-14 RX ADMIN — MIDAZOLAM 2 MG: 1 INJECTION INTRAMUSCULAR; INTRAVENOUS at 07:52

## 2025-03-14 RX ADMIN — TRANEXAMIC ACID 1000 MG: 100 INJECTION, SOLUTION INTRAVENOUS at 08:59

## 2025-03-14 RX ADMIN — ACETAMINOPHEN 1000 MG: 500 TABLET, FILM COATED ORAL at 06:43

## 2025-03-14 RX ADMIN — FENTANYL CITRATE 50 MCG: 50 INJECTION INTRAMUSCULAR; INTRAVENOUS at 07:17

## 2025-03-14 RX ADMIN — PHENYLEPHRINE HYDROCHLORIDE 100 MCG: 0.1 INJECTION, SOLUTION INTRAVENOUS at 08:55

## 2025-03-14 RX ADMIN — DEXAMETHASONE SODIUM PHOSPHATE 4 MG: 10 INJECTION, SOLUTION INTRAMUSCULAR; INTRAVENOUS at 07:17

## 2025-03-14 RX ADMIN — MIDAZOLAM 1 MG: 1 INJECTION INTRAMUSCULAR; INTRAVENOUS at 07:17

## 2025-03-14 RX ADMIN — DEXAMETHASONE SODIUM PHOSPHATE 10 MG: 10 INJECTION INTRAMUSCULAR; INTRAVENOUS at 08:05

## 2025-03-14 RX ADMIN — ACETAMINOPHEN 650 MG: 325 TABLET, FILM COATED ORAL at 11:32

## 2025-03-14 RX ADMIN — PHENYLEPHRINE HYDROCHLORIDE 100 MCG: 0.1 INJECTION, SOLUTION INTRAVENOUS at 08:41

## 2025-03-14 RX ADMIN — PHENYLEPHRINE HYDROCHLORIDE 100 MCG: 0.1 INJECTION, SOLUTION INTRAVENOUS at 08:15

## 2025-03-14 RX ADMIN — OXYCODONE 10 MG: 5 TABLET ORAL at 11:32

## 2025-03-14 RX ADMIN — SODIUM CHLORIDE, SODIUM LACTATE, POTASSIUM CHLORIDE, AND CALCIUM CHLORIDE: 600; 310; 30; 20 INJECTION, SOLUTION INTRAVENOUS at 06:50

## 2025-03-14 RX ADMIN — Medication 10 MG: at 08:15

## 2025-03-14 RX ADMIN — PHENYLEPHRINE HYDROCHLORIDE 100 MCG: 0.1 INJECTION, SOLUTION INTRAVENOUS at 08:46

## 2025-03-14 RX ADMIN — TRANEXAMIC ACID 1000 MG: 100 INJECTION, SOLUTION INTRAVENOUS at 07:54

## 2025-03-14 RX ADMIN — Medication 10 MG: at 08:07

## 2025-03-14 RX ADMIN — MEPIVACAINE HYDROCHLORIDE 50 MG: 20 INJECTION, SOLUTION EPIDURAL; INFILTRATION at 07:55

## 2025-03-14 RX ADMIN — PROPOFOL 100 MCG/KG/MIN: 10 INJECTION, EMULSION INTRAVENOUS at 08:05

## 2025-03-14 RX ADMIN — PHENYLEPHRINE HYDROCHLORIDE 100 MCG: 0.1 INJECTION, SOLUTION INTRAVENOUS at 08:32

## 2025-03-14 RX ADMIN — PHENYLEPHRINE HYDROCHLORIDE 100 MCG: 0.1 INJECTION, SOLUTION INTRAVENOUS at 09:09

## 2025-03-14 RX ADMIN — PHENYLEPHRINE HYDROCHLORIDE 100 MCG: 0.1 INJECTION, SOLUTION INTRAVENOUS at 08:24

## 2025-03-14 RX ADMIN — Medication 10 MG: at 08:46

## 2025-03-14 RX ADMIN — ONDANSETRON 4 MG: 2 INJECTION INTRAMUSCULAR; INTRAVENOUS at 08:05

## 2025-03-14 RX ADMIN — Medication 2000 MG: at 08:07

## 2025-03-14 ASSESSMENT — PAIN SCALES - GENERAL
PAINLEVEL_OUTOF10: 6
PAINLEVEL_OUTOF10: 0

## 2025-03-14 NOTE — PROGRESS NOTES
TRANSFER - IN REPORT:    Verbal report received from Heidi villagran Edvin Lisa  being received from PACU for routine post-op      Report consisted of patient's Situation, Background, Assessment and   Recommendations(SBAR).     Information from the following report(s) Nurse Handoff Report was reviewed with the receiving nurse.    Opportunity for questions and clarification was provided.      Assessment completed upon patient's arrival to unit and care assumed.

## 2025-03-14 NOTE — CARE COORDINATION
CM met with patient and spouse at bedside to discuss home health choices. They were provided a list of home health agencies and their quality metrics. Referral has been sent to [facility]  for PT services and accepted. CM will continue to follow.

## 2025-03-14 NOTE — H&P
The patient has end stage arthritis of the left knee. The patient was see and examined and there are no changes to the patient's orthopedic condition. They have tried conservative treatment for this condition; including antiinflammatories and lifestyle modifications and have failed. The necessity for the joint replacement is still present, and the H&P from the office is still current. The patient is admitted today forProcedure(s) (LRB):  KNEE TOTAL ARTHROPLASTY ROBOTIC LEFT (Left) .

## 2025-03-14 NOTE — FLOWSHEET NOTE
03/14/25 1453   AVS Reviewed   AVS & discharge instructions reviewed with patient and/or representative? Yes   Reviewed instructions with Patient   Level of Understanding Questions answered;Verbalized understanding     Discharge instructions reviewed and copy provided for pt.  Opportunity provided for questions.  Pt verbalized understanding.  Iv was removed without difficulty.  Pt taken  to the car via wc

## 2025-03-14 NOTE — ANESTHESIA POSTPROCEDURE EVALUATION
Department of Anesthesiology  Postprocedure Note    Patient: Edvin Lisa  MRN: [id number]  YOB: 1955  Date of evaluation: 3/14/2025    Procedure Summary       Date: 03/14/25 [facility]    Anesthesia Start: 0743 Anesthesia Stop: 0926    Procedure: KNEE TOTAL ARTHROPLASTY ROBOTIC LEFT (Left: Knee) Diagnosis:       Degenerative arthritis of left knee      (Degenerative arthritis of left knee [M17.12])    Surgeons: Deric Roe MD Responsible Provider: Ade Roth MD    Anesthesia Type: spinal ASA Status: 3            Anesthesia Type: No value filed.    Mirza Phase I: Mirza Score: 8    Mirza Phase II: Mirza Score: 8    Anesthesia Post Evaluation    Patient location during evaluation: PACU  Patient participation: complete - patient participated  Level of consciousness: awake and alert  Pain scale: pain adequately controlled.  Airway patency: patent  Nausea & Vomiting: no nausea and no vomiting  Cardiovascular status: hemodynamically stable  Respiratory status: acceptable  Hydration status: euvolemic  Multimodal analgesia pain management approach  Pain management: adequate    No notable events documented.

## 2025-03-14 NOTE — ANESTHESIA PROCEDURE NOTES
Spinal Block    Patient location during procedure: OR  End time: 3/14/2025 7:58 AM  Reason for block: primary anesthetic  Staffing  Performed: anesthesiologist   Anesthesiologist: Ade Roth MD  Performed by: Ade Roth MD  Authorized by: Ade Roth MD    Spinal Block  Patient position: sitting  Prep: ChloraPrep  Patient monitoring: cardiac monitor, continuous pulse ox, frequent blood pressure checks and oxygen  Approach: midline  Location: L3/L4  Provider prep: mask and sterile gloves  Local infiltration: lidocaine  Needle  Needle type: pencil-tip   Needle gauge: 25 G  Needle length: 3.5 in  Needle insertion depth: 8 cm  Assessment  Sensory level: T6  Swirl obtained: Yes  CSF: clear  Attempts: 1  Hemodynamics: stable  Preanesthetic Checklist  Completed: patient identified, IV checked, site marked, risks and benefits discussed, surgical/procedural consents, equipment checked, pre-op evaluation, timeout performed, anesthesia consent given, oxygen available, monitors applied/VS acknowledged, fire risk safety assessment completed and verbalized and blood product R/B/A discussed and consented

## 2025-03-14 NOTE — PROGRESS NOTES
ACUTE PHYSICAL THERAPY GOALS:   (Developed with and agreed upon by patient and/or caregiver.)  GOALS (1-4 days):  (1.)Mr. Lisa will move from supine to sit and sit to supine  in bed with MODIFIED INDEPENDENCE.  (2.)Mr. Lisa will transfer from bed to chair and chair to bed with MODIFIED INDEPENDENCE using the least restrictive device.  (3.)Mr. Lisa will ambulate with MODIFIED INDEPENDENCE for 250 feet with the least restrictive device.  (4.)Mr. Lisa will ambulate up/down 8 steps with left railing with MODIFIED INDEPENDENCE using no assistive device.  (5.)Mr. Lisa will increase left knee ROM to 0-115°.      PHYSICAL THERAPY: TOTAL KNEE ARTHROPLASTY Initial Assessment, Daily Note, and AM  (Link to Caseload Tracking: PT Visit Days : 1  Acknowledge Orders  Time In/Out  PT Charge Capture  Rehab Caseload Tracker  Episode   Edvin Lisa is a 69 y.o. male   PRIMARY DIAGNOSIS: Degenerative arthritis of left knee  Degenerative arthritis of left knee [M17.12]  Osteoarthritis of left knee, unspecified osteoarthritis type [M17.12]  Procedure(s) (LRB):  KNEE TOTAL ARTHROPLASTY ROBOTIC LEFT (Left)  * Day of Surgery *  Reason for Referral: Generalized Muscle Weakness (M62.81)  Other lack of cordination (R27.8)  Difficulty in walking, Not elsewhere classified (R26.2)  Other abnormalities of gait and mobility (R26.89)  History of falling (Z91.81)  Outpatient in a bed: [place] / Product Type: *No Product type* /     REHAB RECOMMENDATIONS:   Recommendation to date pending progress:  Setting:  Home Health Therapy    Equipment:    None - has RW     RANGE OF MOTION:   Left Knee Flexion: L Knee Flexion (0-145): 100  Left Knee Extension: L Knee Extension (0): 5     GAIT: I Mod I S SBA CGA Min Mod Max Total  NT x2 Comments:   Level of Assistance [] [] [] [x] [] [] [] [] [] [] []            Weightbearing Status  Left Lower Extremity Weight Bearing: Weight Bearing As Tolerated    Distance  150 feet    Gait Quality  intervention is medically necessary to address:)  Decreased ADL/Functional Activities, Decreased Activity Tolerance, Decreased AROM/PROM, Decreased Gait Ability, Decreased Strength, Decreased Transfer Abilities, and Increased Pain   INTERVENTIONS PLANNED:   (Benefits and precautions of physical therapy have been discussed with the patient.)  Therapeutic Activity, Therapeutic Exercise/HEP, Gait Training, Modalities, and Education       TREATMENT:   EVALUATION: LOW COMPLEXITY: (Untimed Charge)  The initial evaluation charge encompasses clinical chart review, objective assessment, interpretation of assessment, and skilled monitoring of the patient's response to treatment in order to develop a plan of care.     TREATMENT:   Therapeutic Activity (25 Minutes): Therapeutic activity included Rolling, Supine to Sit, Scooting, Lateral Scooting, Transfer Training, Ambulation on level ground, Stair Training, Sitting balance , Standing balance, and education/exercises to improve functional Activity tolerance, Balance, Coordination, Mobility, Strength, and ROM.    TREATMENT GRID:  THERAPEUTIC  EXERCISES: DATE:  3/14 DATE:   DATE:      AM PM AM PM AM PM    [x] [] [] [] [] []   Ankle Pumps HEP        Quad Sets 10        Gluteal Sets 10        Hip Abd/ADduction 10        Straight Leg Raises 10        Knee Slides 10        Short Arc Quads 10        Chair Slides 10                          B = bilateral; AA = active assistive; A = active; P = passive      Educated patient and/or family/caregiver on the following: Cryocuff/Icepacks, D/C Instruction Review, Fall Precautions, Hip Precautions (SHAHLA), Home Exercises, No Pillow Under Knee (TKA), and Walker Management/Safety    Interdisciplinary Patient Education   (Reference education tab)    AFTER TREATMENT PRECAUTIONS: Alarm Activated, Bed/Chair Locked, Call light within reach, Chair, Heels floated, RN notified, and Visitors at bedside    INTERDISCIPLINARY COLLABORATION:  RN/ PCT, PT/ PTA,

## 2025-03-14 NOTE — PERIOP NOTE
TRANSFER - OUT REPORT:    Verbal report given to Rebeca GOLDSMITH on Edvin Lisa  being transferred to [facility] for routine post-op       Report consisted of patient’s Situation, Background, Assessment and   Recommendations(SBAR).     Information from the following report(s) Nurse Handoff Report, Adult Overview, Surgery Report, Cardiac Rhythm Afib, and Neuro Assessment was reviewed with the receiving nurse.    Lines:   Peripheral IV 03/14/25 Posterior;Right Hand (Active)   Site Assessment Clean, dry & intact 03/14/25 0925   Line Status Infusing 03/14/25 0925   Line Care Connections checked and tightened 03/14/25 0925   Phlebitis Assessment No symptoms 03/14/25 0925   Infiltration Assessment 0 03/14/25 0925   Dressing Status Clean, dry & intact 03/14/25 0925   Dressing Type Transparent 03/14/25 0925   Dressing Intervention New 03/14/25 0649        Opportunity for questions and clarification was provided.      Patient transported with:   O2 @ 2 liters  Tech    VTE prophylaxis orders have been written for Edvin Lisa.

## 2025-03-14 NOTE — ANESTHESIA PROCEDURE NOTES
Peripheral Block    Patient location during procedure: pre-op  Reason for block: post-op pain management and at surgeon's request  Start time: 3/14/2025 7:17 AM  End time: 3/14/2025 7:20 AM  Staffing  Performed: anesthesiologist   Anesthesiologist: Ade Roth MD  Performed by: Ade Roth MD  Authorized by: Ade Roth MD    Preanesthetic Checklist  Completed: patient identified, IV checked, site marked, risks and benefits discussed, surgical/procedural consents, equipment checked, pre-op evaluation, timeout performed, anesthesia consent given, oxygen available, monitors applied/VS acknowledged, fire risk safety assessment completed and verbalized and blood product R/B/A discussed and consented  Peripheral Block   Patient position: supine  Prep: ChloraPrep  Provider prep: mask and sterile gloves  Patient monitoring: cardiac monitor, continuous pulse ox, frequent blood pressure checks, IV access, oxygen and responsive to questions  Block type: Femoral  Adductor canal  Laterality: left  Injection technique: single-shot  Guidance: ultrasound guided    Needle   Needle type: insulated echogenic nerve stimulator needle   Needle gauge: 20 G  Needle localization: ultrasound guidance  Needle length: 10 cm  Assessment   Injection assessment: negative aspiration for heme, no paresthesia on injection, local visualized surrounding nerve on ultrasound and no intravascular symptoms  Paresthesia pain: none  Slow fractionated injection: yes  Hemodynamics: stable  Outcomes: uncomplicated and patient tolerated procedure well    Medications Administered  dexAMETHasone (DECADRON) (PF) 10 mg/mL injection - Other   4 mg - 3/14/2025 7:17:00 AM  ropivacaine (NAROPIN) injection 0.2% - Perineural   20 mL - 3/14/2025 7:17:00 AM

## 2025-03-14 NOTE — CARE COORDINATION
Met with Mr. Lisa and his wife, Maria Guadalupe, at bedside for initial CM assessment. Patient is alert and oriented x4. Demographics and PCP verified. Mr. Lisa last saw his PCP in the fall. He recently saw the cardiologist for surgery clearance. He lives with his wife diana two level home with no steps to enter and 15 steps (L rail) to gain access to the second floor. Mr. Lisa was independent with mobility and ADLs at most recent baseline and used no DME or services. They do have equipment at home from previous hospitalizations that include a wheelchair, BSC, and walker. He works full-time and remains an active  in the community. Insured through [employer]. Mr. Lisa plans to return home with spouse at discharge. Patient is pending therapy evaluations at this time. CM will continue to follow and assist with transition at discharge along with any needs that may arise during his stay.       03/14/25 1045   Service Assessment   Patient Orientation Alert and Oriented;Person;Place;Situation   Cognition Alert   History Provided By Patient   Primary Caregiver Self   Accompanied By/Relationship Spouse-Maria Guadalupe Lisa   Support Systems Spouse/Significant Other   Patient's Healthcare Decision Maker is: Legal Next of Kin   PCP Verified by CM Yes   Last Visit to PCP Within last 6 months   Prior Functional Level Independent in ADLs/IADLs   Current Functional Level Other (see comment)  (pending therapy)   Can patient return to prior living arrangement Yes   Ability to make needs known: Good   Family able to assist with home care needs: Yes   Would you like for me to discuss the discharge plan with any other family members/significant others, and if so, who? Yes  (Spouse-Maria Guadalupe Lisa)   Financial Resources None   Community Resources None   CM/SW Referral Other (see comment)  (standard assessment)   Social/Functional History   Lives With Spouse   Type of Home House   Home Layout Two level   Home Access Level entry   Bathroom Shower/Tub Tub/Shower unit

## 2025-03-14 NOTE — ACP (ADVANCE CARE PLANNING)
Advance Care Planning   General Advance Care Planning (ACP) Conversation    Date of Conversation: 03/14/2025  Conducted with: Patient with Decision Making Capacity  Other persons present: Spouse Maria Guadalupe Lisa    Healthcare Decision Maker:    Primary Decision Maker: SloanMaria Guadalupe - Spouse - [phone]      Content/Action Overview:  Reviewed DNR/DNI and patient elects Full Code (Attempt Resuscitation)    Length of Voluntary ACP Conversation in minutes:  <16 minutes (Non-Billable)    Linh Claros

## 2025-03-14 NOTE — ANESTHESIA PRE PROCEDURE
MR, TR, global hypokinesis, LVH      Chronic afib     Neuro/Psych:               GI/Hepatic/Renal: Neg GI/Hepatic/Renal ROS            Endo/Other: Negative Endo/Other ROS   (+) blood dyscrasia (off eliquis for >84 hrs): anticoagulation therapy:..                 Abdominal:             Vascular:          Other Findings:         Anesthesia Plan      spinal     ASA 3           MIPS: Postoperative opioids intended and Prophylactic antiemetics administered.  Anesthetic plan and risks discussed with patient.      Plan discussed with CRNA.          Post-op pain plan if not by surgeon: single peripheral nerve block        Ade Roth MD   3/13/2025

## 2025-03-14 NOTE — PROGRESS NOTES
Pt received to room 313.  Alert and oriented and wife at bedside. Oriented to room and bed controls.

## 2025-03-14 NOTE — PROGRESS NOTES
4 Eyes Skin Assessment     NAME:  Edvin Lisa  YOB: 1955  MEDICAL RECORD NUMBER:  [id number]    The patient is being assessed for  Admission    I agree that at least one RN has performed a thorough Head to Toe Skin Assessment on the patient. ALL assessment sites listed below have been assessed.      Areas assessed by both nurses:    Head, Face, Ears, Shoulders, Back, Chest, Arms, Elbows, Hands, Sacrum. Buttock, Coccyx, Ischium, Legs. Feet and Heels, and Under Medical Devices         Does the Patient have a Wound? No noted wound(s)       Ronaldo Prevention initiated by RN: Yes  Wound Care Orders initiated by RN: No    Pressure Injury (Stage 3,4, Unstageable, DTI, NWPT, and Complex wounds) if present, place Wound referral order by RN under : No    New Ostomies, if present place, Ostomy referral order under : No     Nurse 1 eSignature: Electronically signed by duong wooten RN on 3/14/25 at 11:03 AM EDT    **SHARE this note so that the co-signing nurse can place an eSignature**    Nurse 2 eSignature: Electronically signed by Geneva Meeks RN on 3/14/25 at 11:04 AM EDT

## 2025-03-14 NOTE — DISCHARGE INSTRUCTIONS
[facility]      Patient Discharge Instructions    Edvin Lisa/[id number] : 1955    Admitted 3/14/2025 Discharged: 3/14/2025       IF YOU HAVE ANY PROBLEMS ONCE YOU ARE AT HOME CALL THE FOLLOWING NUMBERS:   Main office number: [phone]      Medications    The medications you are to continue on are listed on the medication reconciliation sheet.   Narcotic pain medications as well as supplemental iron can cause constipation. If this occurs try stopping the narcotic pain medication and/or the iron.   It is important that you take the medication exactly as they are prescribed.  Medications which increase your risk of blood clots are listed to stop for 5 weeks after surgery as well as medications or supplements which increase your risk of bleeding complications.   Keep your medication in the bottles provided by the pharmacist and keep a list of the medication names, dosages, and times to be taken in your wallet.   Do not take other medications without consulting your doctor.       Important Information    Do NOT smoke as this will greatly increase your risk of infection!    Resume your prehospital diet. If you have excessive nausea or vomitting call your doctor's office     Leg swelling and warmth is normal for 6 months after surgery. If you experience swelling in your leg elevate you leg while laying down with your toes above your heart. If you have sudden onset severe swelling with leg pain call our office. Use Salvador Hose stockings until we see you in the office for your follow up appointment.    The stitches deep inside take approximately 6 months to dissolve. There will be sharp shooting, stinging and burning pain. This is normal and will resolve between 3-6 months after surgery.     Difficulty sleeping is normal following total Knee and Hip replacement. You may try melatonin, an over-the-counter sleep aid or benadryl to help with sleep. Most patients will resume sleeping through the night 8

## 2025-03-17 ENCOUNTER — TELEPHONE (OUTPATIENT)
Dept: ORTHOPEDICS UNIT | Age: 70
End: 2025-03-17

## 2025-03-17 ENCOUNTER — TELEPHONE (OUTPATIENT)
Dept: ORTHOPEDIC SURGERY | Age: 70
End: 2025-03-17

## 2025-03-17 NOTE — TELEPHONE ENCOUNTER
Called to follow up after TKA with SDD on 3/14/25.  Doing pretty good.  Post op pain is managed.  Is current with [facility].  Is walking frequently.  Is taking miralax BID.  No questions or concerns.  Reviewed contact number for ortho navigator.

## 2025-03-18 ENCOUNTER — TELEPHONE (OUTPATIENT)
Dept: ORTHOPEDIC SURGERY | Age: 70
End: 2025-03-18

## 2025-03-18 DIAGNOSIS — Z96.652 S/P TOTAL KNEE ARTHROPLASTY, LEFT: Primary | ICD-10-CM

## 2025-03-19 RX ORDER — OXYCODONE HYDROCHLORIDE 5 MG/1
5 TABLET ORAL
Qty: 20 TABLET | Refills: 0 | Status: SHIPPED | OUTPATIENT
Start: 2025-03-19 | End: 2025-03-24

## 2025-03-23 DIAGNOSIS — Z96.652 S/P TOTAL KNEE ARTHROPLASTY, LEFT: ICD-10-CM

## 2025-03-24 ENCOUNTER — TELEPHONE (OUTPATIENT)
Dept: ORTHOPEDIC SURGERY | Age: 70
End: 2025-03-24

## 2025-03-24 DIAGNOSIS — Z96.652 S/P TOTAL KNEE ARTHROPLASTY, LEFT: Primary | ICD-10-CM

## 2025-03-24 RX ORDER — OXYCODONE HYDROCHLORIDE 5 MG/1
5 TABLET ORAL
Qty: 30 TABLET | Refills: 0 | Status: SHIPPED | OUTPATIENT
Start: 2025-03-24 | End: 2025-03-29

## 2025-03-24 RX ORDER — OXYCODONE HYDROCHLORIDE 5 MG/1
5 TABLET ORAL
Qty: 20 TABLET | Refills: 0 | OUTPATIENT
Start: 2025-03-24 | End: 2025-03-29

## 2025-04-01 ENCOUNTER — TELEPHONE (OUTPATIENT)
Dept: ORTHOPEDIC SURGERY | Age: 70
End: 2025-04-01

## 2025-04-01 DIAGNOSIS — Z96.652 S/P TOTAL KNEE ARTHROPLASTY, LEFT: Primary | ICD-10-CM

## 2025-04-01 RX ORDER — OXYCODONE HYDROCHLORIDE 5 MG/1
5 TABLET ORAL
Qty: 20 TABLET | Refills: 0 | Status: SHIPPED | OUTPATIENT
Start: 2025-04-01 | End: 2025-04-06

## 2025-04-01 NOTE — TELEPHONE ENCOUNTER
He needs a refill of Oxycodone sent to the [facility] on file    Declined functional status S/P Right TKR

## 2025-04-03 ENCOUNTER — TELEPHONE (OUTPATIENT)
Dept: ORTHOPEDIC SURGERY | Age: 70
End: 2025-04-03

## 2025-04-03 NOTE — TELEPHONE ENCOUNTER
Yaquelin is calling to add 2 visits next and one the following week. I have given her the verbal okay

## 2025-04-09 ENCOUNTER — OFFICE VISIT (OUTPATIENT)
Dept: ORTHOPEDIC SURGERY | Age: 70
End: 2025-04-09

## 2025-04-09 DIAGNOSIS — Z96.652 S/P TOTAL KNEE ARTHROPLASTY, LEFT: Primary | ICD-10-CM

## 2025-04-09 PROCEDURE — 99024 POSTOP FOLLOW-UP VISIT: CPT | Performed by: PHYSICIAN ASSISTANT

## 2025-04-09 NOTE — PROGRESS NOTES
returns now post left total knee arthroplasty.  Their pain is diminishing and the wound healing.   Their range of motion is improving.    Radiographs: AP/Lateral and sunrise of the left knee taken in the office today reveal a good bone, prosthetic appearance.  The patella is balanced.           Radiographic Diagnosis:  Normal post-operative left total knee.    Recommendations:  Reviewed x-ray findings with the patient.  They are to increase their weight bearing status as tolerated. A cane can be used in transition.  They are to follow the routine rehabilitation protocol.  They are to wean from their pain medications as tolerated.  He will follow-up in 5 months for routine postop recheck and repeat x-rays.

## 2025-04-10 ENCOUNTER — TELEPHONE (OUTPATIENT)
Dept: ORTHOPEDIC SURGERY | Age: 70
End: 2025-04-10

## 2025-04-10 DIAGNOSIS — Z96.652 S/P TOTAL KNEE ARTHROPLASTY, LEFT: Primary | ICD-10-CM

## 2025-04-11 RX ORDER — OXYCODONE HYDROCHLORIDE 5 MG/1
5 TABLET ORAL
Qty: 30 TABLET | Refills: 0 | Status: SHIPPED | OUTPATIENT
Start: 2025-04-11 | End: 2025-04-16

## 2025-04-14 ENCOUNTER — TELEPHONE (OUTPATIENT)
Dept: ORTHOPEDIC SURGERY | Age: 70
End: 2025-04-14

## 2025-04-15 DIAGNOSIS — Z96.652 S/P TOTAL KNEE ARTHROPLASTY, LEFT: Primary | ICD-10-CM

## 2025-04-18 ENCOUNTER — HOSPITAL ENCOUNTER (OUTPATIENT)
Dept: PHYSICAL THERAPY | Age: 70
Setting detail: RECURRING SERIES
Discharge: HOME | End: 2025-04-21
Payer: COMMERCIAL

## 2025-04-18 DIAGNOSIS — Z96.652 S/P TOTAL KNEE ARTHROPLASTY, LEFT: Primary | ICD-10-CM

## 2025-04-18 DIAGNOSIS — M25.662 STIFFNESS OF LEFT KNEE, NOT ELSEWHERE CLASSIFIED: ICD-10-CM

## 2025-04-18 PROCEDURE — 97161 PT EVAL LOW COMPLEX 20 MIN: CPT

## 2025-04-18 ASSESSMENT — PAIN SCALES - GENERAL: PAINLEVEL_OUTOF10: 4

## 2025-04-18 NOTE — PROGRESS NOTES
Edvin Lisa  : 1955  [place] [employer] [place] [employer]  Secondary:  [facility] [place] [address] [place] [address]  Phone: [phone]  Fax: [phone] Plan Frequency: 2x/wk, 8 weeks  Plan of Care/Certification Expiration Date: 25        Plan of Care/Certification Expiration Date:  Plan of Care/Certification Expiration Date: 25    Frequency/Duration: Plan Frequency: 2x/wk, 8 weeks      Time In/Out:   Time In: 0845  Time Out: 930      PT Visit Info:         Visit Count:  1    OUTPATIENT PHYSICAL THERAPY:   Treatment Note 2025       Episode  (TKA OP)               Treatment Diagnosis:    S/P total knee arthroplasty, left  Stiffness of left knee, not elsewhere classified  Medical/Referring Diagnosis:    S/P total knee arthroplasty, left    Referring Physician:  Ashok Longoria PA MD Orders:  PT Eval and Treat   Return MD Appt:  September   Date of Onset:  Onset Date: 25     Allergies:   Patient has no known allergies.  Restrictions/Precautions:   None      Interventions Planned (Treatment may consist of any combination of the following):     See Assessment Note    Subjective Comments:   See Evaluation Note from today  Initial Pain Level:     4/10  Post Session Pain Level:      4/10  Medications Last Reviewed: 2025  Updated Objective Findings:  See Evaluation Note from today  Treatment     THERAPEUTIC EXERCISE: (unbilled minutes):    Exercises per grid below to improve mobility, strength, balance, and coordination.   Progressed resistance and repetitions as indicated.     Date:  2025 Date:   Date:     Activity/Exercise Parameters Parameters Parameters   Edu Diagnosis, prognosis, POC, HEP, anatomy/physiology of condition       bridges 3x10, increasing knee flexion     LTR x30     Stair stretch 1x10/side     bike 8 min, full revolutions                     THERAPEUTIC ACTIVITY: ( 0 minutes):    Therapeutic activities per grid below

## 2025-04-18 NOTE — THERAPY EVALUATION
go  12.5 sec, no AD   30 second sit to stand  11 reps, no hands       Test Comments   Sit to stand No hands and grossly equal WB   Floor transfer NT at IE   Stair Negotiation NT at IE       Outcome Measure:   Tool Used: Knee injury and Osteoarthritis Outcome Score for Joint Replacement (KOOS, JR)  Score:  Initial: 9 (Interval: 63.776) 4/18/2025 Most Recent: TBD   Interpretation of Score:  The KOOS, JR contains 7 items from the original KOOS survey. Items are coded from 0 to 4, none to extreme respectively.   KOOS, JR is scored by summing the raw response (range 0-28) and then converting it to an interval score using the table provided below. The interval score ranges from 0 to 100 where 0 represents total knee disability and 100 represents perfect knee health.    ASSESSMENT   Initial Assessment:  Edvin Lisa presents to physical therapy with decreased strength, ROM, joint mobility, functional mobility. These S/S are consistent with L TKA. Patient will benefit from skilled physical therapy for manual therapeutic techniques (as appropriate), therapeutic exercises and activities, neuromuscular re-education, and comprehensive home exercises program to address current impairments and functional limitations.     Edvin Lisa will benefit from skilled PT (medically necessary) in order to address above deficits affecting participation in basic ADLs and overall functional tolerance.     Therapy Problem List: (Impacting functional limitations):    Increased Pain, Decreased Strength, Decreased ROM, Decreased Transfer Abilities, Decreased Ambulation Ability/Technique, Decreased Functional Mobility, Decreased [place] with Home Exercise Program, Decreased Balance, Decreased Body Mechanics, and Decreased Activity Tolerance/Endurance*   Therapy Prognosis:   Good     Initial Assessment Complexity:   Low Complexity       PLAN   Effective Dates: 4/18/2025 TO Plan of Care/Certification Expiration Date: 06/17/25

## 2025-04-21 ENCOUNTER — CLINICAL DOCUMENTATION (OUTPATIENT)
Dept: ORTHOPEDIC SURGERY | Age: 70
End: 2025-04-21

## 2025-05-07 ENCOUNTER — HOSPITAL ENCOUNTER (OUTPATIENT)
Dept: PHYSICAL THERAPY | Age: 70
Setting detail: RECURRING SERIES
Discharge: HOME | End: 2025-05-10
Payer: COMMERCIAL

## 2025-05-07 PROCEDURE — 97110 THERAPEUTIC EXERCISES: CPT

## 2025-05-07 NOTE — PROGRESS NOTES
Edvin Lisa  : 1955  Primary: [employer] ([employer])  Secondary:  [facility] @ [place] [address] [address] [place] [address]  Phone: [phone]  Fax: [phone] Plan Frequency: 2x/wk, 8 weeks  Plan of Care/Certification Expiration Date: 25        Plan of Care/Certification Expiration Date:  Plan of Care/Certification Expiration Date: 25    Frequency/Duration: Plan Frequency: 2x/wk, 8 weeks      Time In/Out:   Time In: 0845  Time Out: 0930      PT Visit Info:         Visit Count:  2    OUTPATIENT PHYSICAL THERAPY:   Treatment Note 2025       Episode  (TKA OP)               Treatment Diagnosis:    S/P total knee arthroplasty, left  Stiffness of left knee, not elsewhere classified  Medical/Referring Diagnosis:    S/P total knee arthroplasty, left    Referring Physician:  Ashok Longoria PA MD Orders:  PT Eval and Treat   Return MD Appt:  September   Date of Onset:  Onset Date: 25     Allergies:   Patient has no known allergies.  Restrictions/Precautions:   None      Interventions Planned (Treatment may consist of any combination of the following):     See Assessment Note    Subjective Comments:   Doing well. He's up to walking a couple of miles a day. He's been working on stairs. Overall feels like things are going very well.   Initial Pain Level:     min /10  Post Session Pain Level:      min /10  Medications Last Reviewed: 2025  Updated Objective Findings:  See Evaluation Note from today  Treatment     THERAPEUTIC EXERCISE: (45 minutes):    Exercises per grid below to improve mobility, strength, balance, and coordination.   Progressed resistance and repetitions as indicated.     Date:  2025 Date:  25 Date:     Activity/Exercise Parameters Parameters Parameters   Edu Diagnosis, prognosis, POC, HEP, anatomy/physiology of condition   Monitor steps and gradually increase steps/day to goal of 15,000 for work    bridges 3x10, increasing knee

## 2025-05-09 ENCOUNTER — HOSPITAL ENCOUNTER (OUTPATIENT)
Dept: PHYSICAL THERAPY | Age: 70
Setting detail: RECURRING SERIES
Discharge: HOME | End: 2025-05-12
Payer: COMMERCIAL

## 2025-05-09 PROCEDURE — 97110 THERAPEUTIC EXERCISES: CPT

## 2025-05-09 NOTE — PROGRESS NOTES
Edvin Lisa  : 1955  Primary: [employer] ([employer])  Secondary:  [facility] @ [place] [address] [place] [address]  Phone: [phone]  Fax: [phone] Plan Frequency: 2x/wk, 8 weeks  Plan of Care/Certification Expiration Date: 25        Plan of Care/Certification Expiration Date:  Plan of Care/Certification Expiration Date: 25    Frequency/Duration: Plan Frequency: 2x/wk, 8 weeks      Time In/Out:   Time In: 0815  Time Out: 0855      PT Visit Info:         Visit Count:  3    OUTPATIENT PHYSICAL THERAPY:   Treatment Note 2025       Episode  (TKA OP)               Treatment Diagnosis:    S/P total knee arthroplasty, left  Stiffness of left knee, not elsewhere classified  Medical/Referring Diagnosis:    S/P total knee arthroplasty, left    Referring Physician:  Ashok Longoria PA MD Orders:  PT Eval and Treat   Return MD Appt:  September   Date of Onset:  Onset Date: 25     Allergies:   Patient has no known allergies.  Restrictions/Precautions:   None      Interventions Planned (Treatment may consist of any combination of the following):     See Assessment Note    Subjective Comments:   Felt fine after last session. Got on his bike at home for about an hour the other day and felt great during and after. Knee's more painful today, possibly due to the weather.  Initial Pain Level:     min /10  Post Session Pain Level:      min /10  Medications Last Reviewed: 2025  Updated Objective Findings:  See Evaluation Note from today  Treatment     THERAPEUTIC EXERCISE: (40 minutes):    Exercises per grid below to improve mobility, strength, balance, and coordination.   Progressed resistance and repetitions as indicated.     Date:  2025 Date:  25 Date:  25   Activity/Exercise Parameters Parameters Parameters   Edu Diagnosis, prognosis, POC, HEP, anatomy/physiology of condition   Monitor steps and gradually increase steps/day to goal of

## 2025-05-13 ENCOUNTER — HOSPITAL ENCOUNTER (OUTPATIENT)
Dept: PHYSICAL THERAPY | Age: 70
Setting detail: RECURRING SERIES
Discharge: HOME | End: 2025-05-16
Payer: COMMERCIAL

## 2025-05-13 PROCEDURE — 97110 THERAPEUTIC EXERCISES: CPT

## 2025-05-13 NOTE — PROGRESS NOTES
Edvin Lisa  : 1955  Primary: [employer] ([employer])  Secondary:  [facility] @ [place] [address] [address] [place] [address]  Phone: [phone]  Fax: [phone] Plan Frequency: 2x/wk, 8 weeks  Plan of Care/Certification Expiration Date: 25        Plan of Care/Certification Expiration Date:  Plan of Care/Certification Expiration Date: 25    Frequency/Duration: Plan Frequency: 2x/wk, 8 weeks      Time In/Out:   Time In: 1030  Time Out: 1110      PT Visit Info:         Visit Count:  4    OUTPATIENT PHYSICAL THERAPY:   Treatment Note 2025       Episode  (TKA OP)               Treatment Diagnosis:    S/P total knee arthroplasty, left  Stiffness of left knee, not elsewhere classified  Medical/Referring Diagnosis:    S/P total knee arthroplasty, left    Referring Physician:  Ashok Longoria PA MD Orders:  PT Eval and Treat   Return MD Appt:  September   Date of Onset:  Onset Date: 25     Allergies:   Patient has no known allergies.  Restrictions/Precautions:   None      Interventions Planned (Treatment may consist of any combination of the following):     See Assessment Note    Subjective Comments:   Doing well. Been riding his stationary bike a lot.   Initial Pain Level:     min /10  Post Session Pain Level:      min /10  Medications Last Reviewed: 2025  Updated Objective Findings:  See Evaluation Note from today  Treatment     THERAPEUTIC EXERCISE: (40 minutes):    Exercises per grid below to improve mobility, strength, balance, and coordination.   Progressed resistance and repetitions as indicated.     Date:  2025 Date:  25 Date:  25 Date  25   Activity/Exercise Parameters Parameters Parameters    Edu Diagnosis, prognosis, POC, HEP, anatomy/physiology of condition   Monitor steps and gradually increase steps/day to goal of 15,000 for work     bridges 3x10, increasing knee flexion      LTR x30      Stair stretch 1x10/side 2x10

## 2025-05-16 ENCOUNTER — HOSPITAL ENCOUNTER (OUTPATIENT)
Dept: PHYSICAL THERAPY | Age: 70
Setting detail: RECURRING SERIES
Discharge: HOME | End: 2025-05-19
Payer: COMMERCIAL

## 2025-05-16 PROCEDURE — 97110 THERAPEUTIC EXERCISES: CPT

## 2025-05-16 NOTE — PROGRESS NOTES
Edvin Lisa  : 1955  Primary: [employer] ([employer])  Secondary:  [facility] @ [place] [address] [address] [place] [address]  Phone: [phone]  Fax: [phone] Plan Frequency: 2x/wk, 8 weeks  Plan of Care/Certification Expiration Date: 25        Plan of Care/Certification Expiration Date:  Plan of Care/Certification Expiration Date: 25    Frequency/Duration: Plan Frequency: 2x/wk, 8 weeks      Time In/Out:   Time In: 0900  Time Out: 0944      PT Visit Info:         Visit Count:  5    OUTPATIENT PHYSICAL THERAPY:   Treatment Note 2025       Episode  (TKA OP)               Treatment Diagnosis:    S/P total knee arthroplasty, left  Stiffness of left knee, not elsewhere classified  Medical/Referring Diagnosis:    S/P total knee arthroplasty, left    Referring Physician:  Ashok Longoria PA MD Orders:  PT Eval and Treat   Return MD Appt:  September   Date of Onset:  Onset Date: 25     Allergies:   Patient has no known allergies.  Restrictions/Precautions:   None      Interventions Planned (Treatment may consist of any combination of the following):     See Assessment Note    Subjective Comments:   Had more swelling yesterday when he woke up. He's been more tired this week and felt like he couldn't do his usual biking and walking.   Initial Pain Level:     min /10  Post Session Pain Level:      min /10  Medications Last Reviewed: 2025  Updated Objective Findings:  See Evaluation Note from today  Treatment     THERAPEUTIC EXERCISE: (44 minutes):    Exercises per grid below to improve mobility, strength, balance, and coordination.   Progressed resistance and repetitions as indicated.     Date:  2025 Date:  25 Date:  25 Date  25 Date  25   Activity/Exercise Parameters Parameters Parameters     Edu Diagnosis, prognosis, POC, HEP, anatomy/physiology of condition   Monitor steps and gradually increase steps/day to goal of 15,000

## 2025-05-20 ENCOUNTER — HOSPITAL ENCOUNTER (OUTPATIENT)
Dept: PHYSICAL THERAPY | Age: 70
Setting detail: RECURRING SERIES
Discharge: HOME | End: 2025-05-23
Payer: COMMERCIAL

## 2025-05-20 PROCEDURE — 97110 THERAPEUTIC EXERCISES: CPT

## 2025-05-20 NOTE — PROGRESS NOTES
Edvin Lisa  : 1955  Primary: [employer] ([employer])  Secondary:  [facility] @ [place] [address] [place] [address]  Phone: [phone]  Fax: [phone] Plan Frequency: 2x/wk, 8 weeks  Plan of Care/Certification Expiration Date: 25        Plan of Care/Certification Expiration Date:  Plan of Care/Certification Expiration Date: 25    Frequency/Duration: Plan Frequency: 2x/wk, 8 weeks      Time In/Out:   Time In: 1030  Time Out: 1114      PT Visit Info:         Visit Count:  6    OUTPATIENT PHYSICAL THERAPY:   Treatment Note 2025       Episode  (TKA OP)               Treatment Diagnosis:    S/P total knee arthroplasty, left  Stiffness of left knee, not elsewhere classified  Medical/Referring Diagnosis:    S/P total knee arthroplasty, left    Referring Physician:  Ashok Longoria PA MD Orders:  PT Eval and Treat   Return MD Appt:  September   Date of Onset:  Onset Date: 25     Allergies:   Patient has no known allergies.  Restrictions/Precautions:   None      Interventions Planned (Treatment may consist of any combination of the following):     See Assessment Note    Subjective Comments:   Wore compression socks to bed last night and he was able to sleep all night and felt much better waking up this morning.   Initial Pain Level:     min /10  Post Session Pain Level:      min /10  Medications Last Reviewed: 2025  Updated Objective Findings:  See Evaluation Note from today  Treatment     THERAPEUTIC EXERCISE: (44 minutes):    Exercises per grid below to improve mobility, strength, balance, and coordination.   Progressed resistance and repetitions as indicated.     Date:  2025 Date:  25 Date:  25 Date  25 Date  25 Date  25   Activity/Exercise Parameters Parameters Parameters      Edu Diagnosis, prognosis, POC, HEP, anatomy/physiology of condition   Monitor steps and gradually increase steps/day to goal of 15,000

## 2025-05-23 ENCOUNTER — HOSPITAL ENCOUNTER (OUTPATIENT)
Dept: PHYSICAL THERAPY | Age: 70
Setting detail: RECURRING SERIES
Discharge: HOME | End: 2025-05-26
Payer: COMMERCIAL

## 2025-05-23 PROCEDURE — 97110 THERAPEUTIC EXERCISES: CPT

## 2025-05-23 NOTE — PROGRESS NOTES
HEP Log Date    see 4/18/25    2.     3.    4.     5.        POC    Recertification Expiration Date      Plan of Care/Certification Expiration Date: 06/17/25     Visit Count  7    Number of Allowed Visits              Treatment/Session Summary:    Treatment Assessment:   Focused more on ROM to start today given stiffness and pain at start. Able to resume other exercises with good tolerance.   Communication/Consultation:  Therapy Evaluation sent to referring provider  Equipment provided today:  HEP  Recommendations/Intent for next treatment session: Next visit will focus on knee ROM, functional strengthening.    >Total Treatment Billable Duration:  45 minutes  Time In: 0900  Time Out: 0945     Venus Montemayor PT         Charge Capture  Events  [employer] Portal  Appt Desk  Attendance Report     Future Appointments   Date Time Provider Department Center   5/28/2025 10:15 AM Venus Montemayor, PT [facility]   5/30/2025 10:15 AM Venus Montemayor, PT [facility]   6/3/2025 10:15 AM Venus Montemayor, PT [facility]   6/10/2025 10:15 AM Venus Montemayor, PT [facility]   6/17/2025 10:15 AM Venus Montemayor, PT [facility]   6/24/2025 10:15 AM Venus Montemayor, PT [facility]   9/10/2025 10:10 AM Deric Roe MD [facility]

## 2025-05-28 ENCOUNTER — HOSPITAL ENCOUNTER (OUTPATIENT)
Dept: PHYSICAL THERAPY | Age: 70
Setting detail: RECURRING SERIES
Discharge: HOME | End: 2025-05-31
Payer: COMMERCIAL

## 2025-05-28 PROCEDURE — 97110 THERAPEUTIC EXERCISES: CPT

## 2025-05-28 NOTE — PROGRESS NOTES
Edvin Lisa  : 1955  Primary: [employer] ([employer])  Secondary:  [facility] @ [place] [address] [address] [place] [address]  Phone: [phone]  Fax: [phone] Plan Frequency: 2x/wk, 8 weeks  Plan of Care/Certification Expiration Date: 25        Plan of Care/Certification Expiration Date:  Plan of Care/Certification Expiration Date: 25    Frequency/Duration: Plan Frequency: 2x/wk, 8 weeks      Time In/Out:   Time In: 1015  Time Out: 1057      PT Visit Info:         Visit Count:  8    OUTPATIENT PHYSICAL THERAPY:   Treatment Note 2025       Episode  (TKA OP)               Treatment Diagnosis:    S/P total knee arthroplasty, left  Stiffness of left knee, not elsewhere classified  Medical/Referring Diagnosis:    S/P total knee arthroplasty, left    Referring Physician:  Ashok Longoria PA MD Orders:  PT Eval and Treat   Return MD Appt:  September   Date of Onset:  Onset Date: 25     Allergies:   Patient has no known allergies.  Restrictions/Precautions:   None      Interventions Planned (Treatment may consist of any combination of the following):     See Assessment Note    Subjective Comments:   Knee feels like it's healing very well. Not having as much pain at night anymore. Wants to focus on more strengthening and conditioning.   Initial Pain Level:     min /10  Post Session Pain Level:      min /10  Medications Last Reviewed: 2025  Updated Objective Findings:  See Evaluation Note from today  Treatment     THERAPEUTIC EXERCISE: (42 minutes):    Exercises per grid below to improve mobility, strength, balance, and coordination.   Progressed resistance and repetitions as indicated.     Date:  25 Date:  25 Date  25 Date  25 Date  25 Date  25 Date  25   Activity/Exercise Parameters Parameters        Edu Monitor steps and gradually increase steps/day to goal of 15,000 for work         bridges          LTR

## 2025-05-30 ENCOUNTER — HOSPITAL ENCOUNTER (OUTPATIENT)
Dept: PHYSICAL THERAPY | Age: 70
Setting detail: RECURRING SERIES
End: 2025-05-30
Payer: COMMERCIAL

## 2025-05-30 PROCEDURE — 97110 THERAPEUTIC EXERCISES: CPT

## 2025-05-30 NOTE — PROGRESS NOTES
Edvin Lisa  : 1955  Primary: [employer] ([employer])  Secondary:  [facility] @ [place] [address] [address] [place] [address]  Phone: [phone]  Fax: [phone] Plan Frequency: 2x/wk, 8 weeks  Plan of Care/Certification Expiration Date: 25        Plan of Care/Certification Expiration Date:  Plan of Care/Certification Expiration Date: 25    Frequency/Duration: Plan Frequency: 2x/wk, 8 weeks      Time In/Out:   Time In: 1013  Time Out: 1056      PT Visit Info:         Visit Count:  9    OUTPATIENT PHYSICAL THERAPY:   Treatment Note 2025       Episode  (TKA OP)               Treatment Diagnosis:    S/P total knee arthroplasty, left  Stiffness of left knee, not elsewhere classified  Medical/Referring Diagnosis:    S/P total knee arthroplasty, left    Referring Physician:  Ashok Longoria PA MD Orders:  PT Eval and Treat   Return MD Appt:  September   Date of Onset:  Onset Date: 25     Allergies:   Patient has no known allergies.  Restrictions/Precautions:   None      Interventions Planned (Treatment may consist of any combination of the following):     See Assessment Note    Subjective Comments:   Felt alright after last session. Not having a lot of pain at night anymore.   Initial Pain Level:     min /10  Post Session Pain Level:      min /10  Medications Last Reviewed: 2025  Updated Objective Findings:  See Evaluation Note from today  Treatment     THERAPEUTIC EXERCISE: (43 minutes):    Exercises per grid below to improve mobility, strength, balance, and coordination.   Progressed resistance and repetitions as indicated.     Date:  25 Date  25 Date  25 Date  25 Date  25 Date  25 Date  35   Activity/Exercise Parameters         Edu          bridges          LTR          Stair stretch 2x10 2x10 2x10  2x10 X10/side X10/side   bike 8 min 8 min 8 min Treadmill walking, 8 min, self-selected pace Treadmill walking, 8    Technique Used Grade Level # Time(s) Effect while being performed                                                                                         HEP Log Date    see 4/18/25    2.     3.    4.     5.        POC    Recertification Expiration Date      Plan of Care/Certification Expiration Date: 06/17/25     Visit Count  9    Number of Allowed Visits              Treatment/Session Summary:    Treatment Assessment:   Continued working on functional strengthening with good tolerance. Added deadlifts for posterior chain strengthening with good form after initial instruction. Increased intensity of session with circuit today with no negative signs or symptoms.   Communication/Consultation:  Therapy Evaluation sent to referring provider  Equipment provided today:  HEP  Recommendations/Intent for next treatment session: Next visit will focus on knee ROM, functional strengthening.    >Total Treatment Billable Duration:  43 minutes  Time In: 1013  Time Out: 1056     Venus Montemayor PT         Charge Capture  Events  [employer] Portal  Appt Desk  Attendance Report     Future Appointments   Date Time Provider Department Center   6/3/2025 10:15 AM Venus Montemayor, PT [facility]   6/10/2025 10:15 AM Venus Montemayor, PT [facility]   6/17/2025 10:15 AM Venus Montemayor, PT [facility]   6/24/2025 10:15 AM Venus Montemayor, PT [facility]   9/10/2025 10:10 AM Deric Roe MD [facility]

## 2025-06-03 ENCOUNTER — HOSPITAL ENCOUNTER (OUTPATIENT)
Dept: PHYSICAL THERAPY | Age: 70
Setting detail: RECURRING SERIES
Discharge: HOME | End: 2025-06-06
Payer: COMMERCIAL

## 2025-06-03 PROCEDURE — 97110 THERAPEUTIC EXERCISES: CPT

## 2025-06-03 NOTE — PROGRESS NOTES
Edvin Lisa  : 1955  Primary: [employer] ([employer])  Secondary:  [facility] @ [place] [address] [address] [place] [address]  Phone: [phone]  Fax: [phone] Plan Frequency: 2x/wk, 8 weeks  Plan of Care/Certification Expiration Date: 25        Plan of Care/Certification Expiration Date:  Plan of Care/Certification Expiration Date: 25    Frequency/Duration: Plan Frequency: 2x/wk, 8 weeks      Time In/Out:   Time In: 1015  Time Out: 1055      PT Visit Info:         Visit Count:  10    OUTPATIENT PHYSICAL THERAPY:   Treatment Note 6/3/2025       Episode  (TKA OP)               Treatment Diagnosis:    S/P total knee arthroplasty, left  Stiffness of left knee, not elsewhere classified  Medical/Referring Diagnosis:    S/P total knee arthroplasty, left    Referring Physician:  Ashok Longoria PA MD Orders:  PT Eval and Treat   Return MD Appt:  September   Date of Onset:  Onset Date: 25     Allergies:   Patient has no known allergies.  Restrictions/Precautions:   None      Interventions Planned (Treatment may consist of any combination of the following):     See Assessment Note    Subjective Comments:   Got ~00419 steps yesterday. Had some swelling when he went to bed but no pain to speak of this morning.   Initial Pain Level:     min /10  Post Session Pain Level:      min /10  Medications Last Reviewed: 6/3/2025  Updated Objective Findings:  See Evaluation Note from today  Treatment     THERAPEUTIC EXERCISE: (40 minutes):    Exercises per grid below to improve mobility, strength, balance, and coordination.   Progressed resistance and repetitions as indicated.     Date  25 Date  25 Date  25 Date  25 Date  25 Date  25 Date  6/3/25   Activity/Exercise          Edu          bridges          LTR          Stair stretch 2x10 2x10  2x10 X10/side X10/side    bike 8 min 8 min Treadmill walking, 8 min, self-selected pace Treadmill

## 2025-06-06 ENCOUNTER — APPOINTMENT (OUTPATIENT)
Dept: PHYSICAL THERAPY | Age: 70
End: 2025-06-06
Payer: COMMERCIAL

## 2025-06-10 ENCOUNTER — APPOINTMENT (OUTPATIENT)
Dept: PHYSICAL THERAPY | Age: 70
End: 2025-06-10
Payer: COMMERCIAL

## 2025-06-13 ENCOUNTER — APPOINTMENT (OUTPATIENT)
Dept: PHYSICAL THERAPY | Age: 70
End: 2025-06-13
Payer: COMMERCIAL

## 2025-06-17 ENCOUNTER — HOSPITAL ENCOUNTER (OUTPATIENT)
Dept: PHYSICAL THERAPY | Age: 70
Setting detail: RECURRING SERIES
Discharge: HOME | End: 2025-06-20
Payer: COMMERCIAL

## 2025-06-17 PROCEDURE — 97110 THERAPEUTIC EXERCISES: CPT

## 2025-06-17 NOTE — PROGRESS NOTES
Edvin Lisa  : 1955  Primary: [employer] ([employer])  Secondary:  [facility] @ [place] [address] [address] [place] [address]  Phone: [phone]  Fax: [phone] Plan Frequency: 2x/wk, 8 weeks  Plan of Care/Certification Expiration Date: 25        Plan of Care/Certification Expiration Date:  Plan of Care/Certification Expiration Date: 25    Frequency/Duration: Plan Frequency: 2x/wk, 8 weeks      Time In/Out:   Time In: 1015  Time Out: 1051      PT Visit Info:         Visit Count:  11    OUTPATIENT PHYSICAL THERAPY:   Treatment Note 2025       Episode  (TKA OP)               Treatment Diagnosis:    S/P total knee arthroplasty, left  Stiffness of left knee, not elsewhere classified  Medical/Referring Diagnosis:    S/P total knee arthroplasty, left    Referring Physician:  Ashok Longoria PA MD Orders:  PT Eval and Treat   Return MD Appt:  September   Date of Onset:  Onset Date: 25     Allergies:   Patient has no known allergies.  Restrictions/Precautions:   None      Interventions Planned (Treatment may consist of any combination of the following):     See Assessment Note    Subjective Comments:   See Discharge   Initial Pain Level:     min /10  Post Session Pain Level:      min /10  Medications Last Reviewed: 2025  Updated Objective Findings:  See Discharge   Treatment     THERAPEUTIC EXERCISE: (36 minutes):    Exercises per grid below to improve mobility, strength, balance, and coordination.   Progressed resistance and repetitions as indicated.     Date  25 Date  25 Date  25 Date  25 Date  25 Date  25 Date  6/3/25 Date  25   Activity/Exercise           Edu           bridges           LTR           Stair stretch 2x10 2x10  2x10 X10/side X10/side     bike 8 min 8 min Treadmill walking, 8 min, self-selected pace Treadmill walking, 8 min, self-selected pace    Incline 4 Treadmill walking, 8 min, self-selected

## 2025-06-17 NOTE — THERAPY DISCHARGE
Edvin Lisa  : 1955  Primary: [employer] ([employer])  Secondary:  [facility] @ [place] [address] [address] [place] [address]  Phone: [phone]  Fax: [phone] Plan Frequency: 2x/wk, 8 weeks    Plan of Care/Certification Expiration Date: 25        Plan of Care/Certification Expiration Date:  Plan of Care/Certification Expiration Date: 25    Frequency/Duration: Plan Frequency: 2x/wk, 8 weeks      Time In/Out:   Time In: 1015  Time Out: 1051      PT Visit Info:         Visit Count:  11                OUTPATIENT PHYSICAL THERAPY:             Discharge Summary 2025               Episode (TKA OP)         Treatment Diagnosis:     S/P total knee arthroplasty, left  Stiffness of left knee, not elsewhere classified  Medical/Referring Diagnosis:    S/P total knee arthroplasty, left    Referring Physician:  Ashok Longoria PA MD Orders:  PT Eval and Treat   Return MD Appt:  September   Date of Onset:  Onset Date: 25     Allergies:  Patient has no known allergies.  Restrictions/Precautions:    None      Medications Last Reviewed: 2025     SUBJECTIVE   History of Injury/Illness (Reason for Referral):  Pt presents s/p L TKA for treatment of functional deficits from knee OA. Pt reports pain has been very well controlled since surgery. Having a hard time with stairs and hasn't tried floor transfers yet. Wants to get his overall endurance back up and start going on walks for exercise again.     Discharge: back at work and feeling really good about it. Going up and down stairs reciprocally now. Feels really good with how everything is going and is ready to DC  Patient Stated Goal(s):  \"walking\"  Initial Pain Level:      min /10   Post Session Pain Level:     min /10  Past Medical History/Comorbidities:   Mr. Lisa  has a past medical history of CAD (coronary artery disease), Chronic anticoagulation, Dilated cardiomyopathy (HCC), Essential hypertension,

## 2025-06-24 ENCOUNTER — APPOINTMENT (OUTPATIENT)
Dept: PHYSICAL THERAPY | Age: 70
End: 2025-06-24
Payer: COMMERCIAL

## (undated) DEVICE — PACKING WND W1INXL6FT VAG WVN COT GZ RADPQ

## (undated) DEVICE — STERILE PRESSURE PROTECTOR PAD® FOR DE MAYO UNIVERSAL DISTRACTOR® (10/CASE): Brand: DE MAYO UNIVERSAL DISTRACTOR®

## (undated) DEVICE — SOLUTION IRRIG 3000ML 0.9% SOD CHL USP UROMATIC PLAS CONT

## (undated) DEVICE — GARMENT,MEDLINE,DVT,INT,CALF,MED, GEN2: Brand: MEDLINE

## (undated) DEVICE — GLOVE SURG SZ 8 L12IN FNGR THK79MIL GRN LTX FREE

## (undated) DEVICE — PAD,ABDOMINAL,5"X9",ST,LF,25/BX: Brand: MEDLINE INDUSTRIES, INC.

## (undated) DEVICE — GLOVE ORANGE PI 7 1/2   MSG9075

## (undated) DEVICE — GAUZE,SPONGE,4"X4",16PLY,STRL,LF,10/TRAY: Brand: MEDLINE

## (undated) DEVICE — SOLUTION IV 250ML 0.9% SOD CHL PH 5 INJ USP VIAFLX PLAS

## (undated) DEVICE — OSCILLATING TIP SAW CARTRIDGE: Brand: STRYKER PRECISION

## (undated) DEVICE — GLOVE SURG SZ 7 L12IN FNGR THK79MIL GRN LTX FREE

## (undated) DEVICE — PIN BNE FIX TEMP L140MM DIA4MM MAKO

## (undated) DEVICE — GLOVE SURG SZ 9 L12IN FNGR THK79MIL GRN LTX FREE

## (undated) DEVICE — HOOD: Brand: T7PLUS

## (undated) DEVICE — PENCIL ES L3M BTTN SWCH HOLSTER W/ BLDE ELECTRD EDGE

## (undated) DEVICE — SUTURE VICRYL + SZ 1-0 L36IN ABSRB UD CTX 1/2 CIR TAPR PNT VCP977H

## (undated) DEVICE — SUTURE ABS ANTIBACT 1-0 CTX 24IN STRATAFIX PDS+ SXPP1A445

## (undated) DEVICE — PIN BNE FIX TEMP L110MM DIA4MM MAKO

## (undated) DEVICE — KIT TRK KNEE PROC VIZADISC

## (undated) DEVICE — DISPOSABLE DRAPE, STERILE, FOR A CDS-3060 5 FOOT TABLE: Brand: PEDIGO PRODUCTS, INC.

## (undated) DEVICE — SYRINGE MED 10ML LUERLOCK TIP W/O SFTY DISP

## (undated) DEVICE — SUTURE N ABSRB BRAIDED 5-0 CTX 39 IN 48 MM WHT BLK XBRAID S 3910900052

## (undated) DEVICE — NEEDLE HYPO 21GA L1.5IN INTRAMUSCULAR S STL LATCH BVL UP

## (undated) DEVICE — DRAPE TWL SURG 16X26IN BLU ORB04] ALLCARE INC]

## (undated) DEVICE — SOLUTION IRRIG 1000ML 0.9% SOD CHL USP POUR PLAS BTL

## (undated) DEVICE — 3M™ STERI-DRAPE™ INSTRUMENT POUCH 1018: Brand: STERI-DRAPE™

## (undated) DEVICE — SUTURE MONOCRYL + SZ 2 0 L27IN ABSRB UD CP 1 L36MM 1 2 CIR REV MCP266H

## (undated) DEVICE — TOTAL KNEE: Brand: MEDLINE INDUSTRIES, INC.

## (undated) DEVICE — SOLUTION IRRIG 1000ML STRL H2O USP PLAS POUR BTL

## (undated) DEVICE — STERILE PVP: Brand: MEDLINE INDUSTRIES, INC.

## (undated) DEVICE — BANDAGE,GAUZE,BULKEE II,4.5"X4.1YD,STRL: Brand: MEDLINE

## (undated) DEVICE — KIT DRP FOR RIO ROBOTIC ARM ASST SYS

## (undated) DEVICE — GLOVE SURG SZ 65 THK91MIL LTX FREE SYN POLYISOPRENE

## (undated) DEVICE — TOTAL SHOULDER DR POSTA: Brand: MEDLINE INDUSTRIES, INC.

## (undated) DEVICE — SLING & SWATHE: Brand: DEROYAL

## (undated) DEVICE — BIPOLAR SEALER 23-112-1 AQM 6.0: Brand: AQUAMANTYS ®

## (undated) DEVICE — SUTURE N ABSRB BRAIDED 2-0 MO-6 39 IN 26 MM 1/2 CIR BLU BLK 3910900023

## (undated) DEVICE — SYRINGE MED 30ML STD CLR PLAS LUERLOCK TIP N CTRL DISP

## (undated) DEVICE — COVER,TABLE,HEAVY DUTY,79"X110",STRL: Brand: MEDLINE

## (undated) DEVICE — GLOVE SURG SZ 9 THK91MIL LTX FREE SYN POLYISOPRENE ANTI

## (undated) DEVICE — KIT INT FIX FEM TIB CKPT MAKOPLASTY